# Patient Record
Sex: FEMALE | Race: BLACK OR AFRICAN AMERICAN | Employment: FULL TIME | URBAN - METROPOLITAN AREA
[De-identification: names, ages, dates, MRNs, and addresses within clinical notes are randomized per-mention and may not be internally consistent; named-entity substitution may affect disease eponyms.]

---

## 2017-01-25 DIAGNOSIS — I10 ESSENTIAL HYPERTENSION WITH GOAL BLOOD PRESSURE LESS THAN 140/90: ICD-10-CM

## 2017-01-25 RX ORDER — LISINOPRIL 20 MG/1
20 TABLET ORAL DAILY
Qty: 90 TAB | Refills: 3 | Status: SHIPPED | OUTPATIENT
Start: 2017-01-25 | End: 2018-01-31 | Stop reason: SDUPTHER

## 2017-02-08 ENCOUNTER — CLINICAL SUPPORT (OUTPATIENT)
Dept: CARDIOLOGY CLINIC | Age: 57
End: 2017-02-08

## 2017-02-08 DIAGNOSIS — I46.9 CARDIAC ARREST (HCC): ICD-10-CM

## 2017-02-08 DIAGNOSIS — I51.7 LVH (LEFT VENTRICULAR HYPERTROPHY): Primary | ICD-10-CM

## 2017-02-08 DIAGNOSIS — Z95.810 AICD (AUTOMATIC CARDIOVERTER/DEFIBRILLATOR) PRESENT: ICD-10-CM

## 2017-05-02 ENCOUNTER — TELEPHONE (OUTPATIENT)
Dept: CARDIOLOGY CLINIC | Age: 57
End: 2017-05-02

## 2017-05-10 ENCOUNTER — OFFICE VISIT (OUTPATIENT)
Dept: CARDIOLOGY CLINIC | Age: 57
End: 2017-05-10

## 2017-05-10 ENCOUNTER — CLINICAL SUPPORT (OUTPATIENT)
Dept: CARDIOLOGY CLINIC | Age: 57
End: 2017-05-10

## 2017-05-10 VITALS
HEIGHT: 67 IN | SYSTOLIC BLOOD PRESSURE: 205 MMHG | HEART RATE: 56 BPM | OXYGEN SATURATION: 97 % | DIASTOLIC BLOOD PRESSURE: 105 MMHG | BODY MASS INDEX: 32.96 KG/M2 | WEIGHT: 210 LBS

## 2017-05-10 DIAGNOSIS — B18.1 HEPATITIS B CARRIER (HCC): ICD-10-CM

## 2017-05-10 DIAGNOSIS — I51.7 LVH (LEFT VENTRICULAR HYPERTROPHY): ICD-10-CM

## 2017-05-10 DIAGNOSIS — Z95.810 AICD (AUTOMATIC CARDIOVERTER/DEFIBRILLATOR) PRESENT: ICD-10-CM

## 2017-05-10 DIAGNOSIS — I10 ESSENTIAL HYPERTENSION WITH GOAL BLOOD PRESSURE LESS THAN 140/90: ICD-10-CM

## 2017-05-10 DIAGNOSIS — E78.2 MIXED HYPERLIPIDEMIA: ICD-10-CM

## 2017-05-10 DIAGNOSIS — I46.9 CARDIAC ARREST (HCC): ICD-10-CM

## 2017-05-10 DIAGNOSIS — R73.03 PREDIABETES: ICD-10-CM

## 2017-05-10 DIAGNOSIS — I42.8 CARDIOMYOPATHY, NONISCHEMIC (HCC): Primary | ICD-10-CM

## 2017-05-10 NOTE — PROGRESS NOTES
1. Have you been to the ER, urgent care clinic since your last visit? Hospitalized since your last visit? No    2. Have you seen or consulted any other health care providers outside of the [address] [place] [address] since your last visit? Include any pap smears or colon screening.  No

## 2017-05-10 NOTE — MR AVS SNAPSHOT
Visit Information Date & Time Provider Department Dept. Phone Encounter #  
 5/10/2017 10:30 AM Costa Zheng MD [address] [place] [address] Specialist at [place] [address] [id number] Follow-up Instructions Return in about 1 day (around 5/11/2017). Your Appointments 5/10/2017 10:30 AM  
Follow Up with Costa Zheng MD  
Cardio Specialist at [facility]) Appt Note: dx 6mos check up; Patient confirmed [address] [place] [address]  
  
   
 [address] [place] [address] 5/10/2017  2:20 PM  
CARELINK with Pacer Hv Csi Cardiovascular Specialists [facility] ([facility]) Appt Note: 3 m carelink from 2/8/17 [address] [address] [phone] Guy Feli  
  
    
 8/9/2017  1:20 PM  
CARELINK with [address] [place] [address] Cardiovascular Specialists [facility] ([facility]) Appt Note: 6 m carelink from 2/8/17, last 5/10/17 [address] [address] [phone]  
  
    
 11/8/2017  1:40 PM  
CARELINK with Pacer Hv Csi [facility] ([facility] Appt Note: 9 m carelink, last 8/9/17 [address] [address] [phone] Upcoming Health Maintenance Date Due Hepatitis C Screening 1960 Pneumococcal 19-64 Medium Risk (1 of 1 - PPSV23) 8/20/1981 DTaP/Tdap/Td series (1 - Tdap) 8/20/1983 PAP AKA CERVICAL CYTOLOGY 8/20/1983 BREAST CANCER SCRN MAMMOGRAM 8/20/2012 FOBT Q 1 YEAR AGE 50-75 8/20/2012 INFLUENZA AGE 9 TO ADULT 8/1/2017 Allergies as of 5/10/2017  Review Complete On: 5/10/2017 By: Andres Harrington RN No Known Allergies Current Immunizations  Reviewed on 5/9/2016 No immunizations on file. Not reviewed this visit Vitals BP Pulse Height(growth percentile) Weight(growth percentile) SpO2 BMI  
 (!) 205/105 (!) 56 5' 7\" (1.702 m) 210 lb (95.3 kg) 97% 32.89 kg/m2 OB Status Smoking Status Menopause Former Smoker BMI and BSA Data Body Mass Index Body Surface Area  
 32.89 kg/m 2 2.12 m 2 Preferred Pharmacy Pharmacy Name Phone [facility] [address] [place], [place] [address]. [phone] Your Updated Medication List  
  
   
This list is accurate as of: 5/10/17 10:17 AM.  Always use your most recent med list.  
  
  
  
  
 aspirin delayed-release 81 mg tablet Take 2 Tabs by mouth daily. atorvastatin 10 mg tablet Commonly known as:  LIPITOR  
TAKE ONE TABLET BY MOUTH AT BEDTIME  
  
 carvedilol 25 mg tablet Commonly known as:  Jaida Spinner Take 1 Tab by mouth two (2) times daily (with meals). lisinopril 20 mg tablet Commonly known as:  Marin Lofty Take 1 Tab by mouth daily. magnesium oxide 400 mg tablet Commonly known as:  MAG-OX Take 400 mg by mouth two (2) times a day. Follow-up Instructions Return in about 1 day (around 5/11/2017). Patient Instructions Obtain BP monitor if possible Only check your blood pressure a couple of times per week - at least 2 hours after bp medication (Lisinopril and Coreg) - and if you feel symptomatic Introducing [facility]! [facility] introduces [employer] patient portal. Now you can access parts of your medical record, email your doctor's office, and request medication refills online. 1. In your internet browser, go to [email or web address] 2. Click on the First Time User? Click Here link in the Sign In box. You will see the New Member Sign Up page. 3. Enter your [employer] Access Code exactly as it appears below. You will not need to use this code after youve completed the sign-up process.  If you do not sign up before the expiration date, you must request a new code. · Ellie Access Code: [email or web address] Expires: 8/8/2017 10:17 AM 
 
4. Enter the last four digits of your Social Security Number (xxxx) and Date of Birth (mm/dd/yyyy) as indicated and click Submit. You will be taken to the next sign-up page. 5. Create a Ellie ID. This will be your Ellie login ID and cannot be changed, so think of one that is secure and easy to remember. 6. Create a Ellie password. You can change your password at any time. 7. Enter your Password Reset Question and Answer. This can be used at a later time if you forget your password. 8. Enter your e-mail address. You will receive e-mail notification when new information is available in [address] 9. Click Sign Up. You can now view and download portions of your medical record. 10. Click the Download Summary menu link to download a portable copy of your medical information. If you have questions, please visit the Frequently Asked Questions section of the Ellie website. Remember, Ellie is NOT to be used for urgent needs. For medical emergencies, dial 911. Now available from your iPhone and Android! Please provide this summary of care documentation to your next provider. Your primary care clinician is listed as Toney Baron. If you have any questions after today's visit, please call [phone].

## 2017-05-10 NOTE — PATIENT INSTRUCTIONS
Obtain BP monitor if possible    Only check your blood pressure a couple of times per week - at least 2 hours after bp medication (Lisinopril and Coreg) - and if you feel symptomatic

## 2017-05-15 NOTE — PROGRESS NOTES
Subjective:      Juanita Barcenas is in the office today for cardiac reevaluation. She is a 79-year-old woman who had a cardiac arrest in the setting of hypokalemia on 05/02/2016. During her hospitalization, she was found to have an ejection fraction of only 25%. Prior to her discharge, her ejection fraction improved to 45-50%. She did have a previous cardiac catheterization done in 2014, which showed noncritical coronary artery disease. She had a complicated in-hospital course of acute kidney injury, altered mental status/encephalopathy, and aspiration pneumonia. She ultimately had an AICD placed by Dr. Chela Lewis. The patient returned to work shortly after her hospital discharge. She has been doing well with no complaints. She has had no limiting shortness of breath. She has had no PND or orthopnea. She has been trying to walk some for exercise. Her blood pressure is markedly elevated in the office today. She was instructed to get a blood pressure monitor during her last visit, but she failed to do so. She says that she checks it outside the office at the [facility] and her pressure is always good. Patient Active Problem List    Diagnosis Date Noted    LVH (left ventricular hypertrophy) 07/21/2016    Cardiac arrest ([facility] 05/31/2016    AICD (automatic cardioverter/defibrillator) present 05/31/2016    Essential hypertension with goal blood pressure less than 140/90 05/18/2016    Prediabetes 05/18/2016    Mixed hyperlipidemia 05/18/2016    Hepatitis B carrier ([facility] 05/18/2016    Anemia 05/18/2016    Cardiomyopathy, nonischemic (HCC) 05/04/2016     Current Outpatient Prescriptions   Medication Sig Dispense Refill    lisinopril (PRINIVIL, ZESTRIL) 20 mg tablet Take 1 Tab by mouth daily. 90 Tab 3    carvedilol (COREG) 25 mg tablet Take 1 Tab by mouth two (2) times daily (with meals).  60 Tab 5    atorvastatin (LIPITOR) 10 mg tablet TAKE ONE TABLET BY MOUTH AT BEDTIME 30 Tab 6    magnesium oxide (MAG-OX) 400 mg tablet Take 400 mg by mouth two (2) times a day.  aspirin delayed-release 81 mg tablet Take 2 Tabs by mouth daily. 61 Tab 0     No Known Allergies  Past Medical History:   Diagnosis Date    [facility] 05/2016    s/p ICD    Diabetes [facility]     Hypertension     Nonischemic cardiomyopathy ([facility]      Past Surgical History:   Procedure Laterality Date    HX IMPLANTABLE CARDIOVERTER DEFIBRILLATOR  05/2016     Family History   Problem Relation Age of Onset    Heart Disease Mother 77    Diabetes Father     Stroke Maternal Grandmother      History   Smoking Status    Former Smoker   Smokeless Tobacco    Former User    Quit date: 5/2/2016          Review of Systems, additional:  Constitutional: negative  Eyes: negative  Respiratory: negative  Cardiovascular: negative  Gastrointestinal: negative  Musculoskeletal:negative  Neurological: negative  Behvioral/Psych: negative  Endocrine: negative  ENT: negative    Objective:     Visit Vitals    BP (!) 205/105    Pulse (!) 56    Ht 5' 7\" (1.702 m)    Wt 210 lb (95.3 kg)    SpO2 97%    BMI 32.89 kg/m2     General:  alert, cooperative, no distress   Chest Wall: inspection normal - no chest wall deformities or tenderness, respiratory effort normal   Lung: clear to auscultation bilaterally   Heart:  normal rate and regular rhythm, S1 and S2 normal, no murmurs noted, no gallops noted   Abdomen: soft, non-tender. Bowel sounds normal. No masses,  no organomegaly   Extremities: extremities normal, atraumatic, no cyanosis or edema Skin: no rashes   Neuro: alert, oriented, normal speech, no focal findings or movement disorder noted         Assessment/Plan:       ICD-10-CM ICD-9-CM    1. Cardiomyopathy, nonischemic ([facility] last EF 45-50%. I42.8 425.4    2. Cardiac arrest (HCC) I46.9 427.5    3.  Essential hypertension with goal blood pressure less than 140/90, severely elevated in office today. She believes it is only elevated when she sees us in the office. She has not purchased a BP cuff as of yet. RT 1 month for BP check. RT 4 mos. I10 401.9    4. LVH (left ventricular hypertrophy) I51.7 429.3    5. Hepatitis B carrier (HCC) B18.1 V02.61    6. Prediabetes R73.03 790.29    7. AICD (automatic cardioverter/defibrillator) present Z95.810 V45.02    8.  Mixed hyperlipidemia E78.2 272.2

## 2017-06-07 ENCOUNTER — CLINICAL SUPPORT (OUTPATIENT)
Dept: CARDIOLOGY CLINIC | Age: 57
End: 2017-06-07

## 2017-06-07 VITALS — HEART RATE: 58 BPM | OXYGEN SATURATION: 98 % | DIASTOLIC BLOOD PRESSURE: 115 MMHG | SYSTOLIC BLOOD PRESSURE: 221 MMHG

## 2017-06-07 DIAGNOSIS — I10 ESSENTIAL HYPERTENSION WITH GOAL BLOOD PRESSURE LESS THAN 140/90: Primary | ICD-10-CM

## 2017-06-07 RX ORDER — AMLODIPINE BESYLATE 10 MG/1
10 TABLET ORAL DAILY
Qty: 30 TAB | Refills: 6 | Status: SHIPPED | OUTPATIENT
Start: 2017-06-07 | End: 2018-01-03 | Stop reason: SDUPTHER

## 2017-06-07 NOTE — MR AVS SNAPSHOT
Visit Information Date & Time Provider Department Dept. Phone Encounter #  
 6/7/2017 10:00 AM Bp/Ekg Nii Doherty Cardio Specialist at [place] [address] [id number] Your Appointments 6/21/2017  1:30 PM  
Nurse Visit with Bp/Ekg Nolvia Csi Cardio Specialist at [facility]) Appt Note: 2 week BP check [address] [place] [address]  
  
    
 8/9/2017  1:20 PM  
CARELINK with Bennett Serranoi Cardiovascular Specialists [facility]) Appt Note: 6 m carelink from 2/8/17, last 5/10/17 [place] [address]  
[phone] [address] 9/13/2017 10:15 AM  
Follow Up with Trisha Arce MD  
Cardio Specialist at [facility]) Appt Note: 4 month f/u -kmc [address] [place]  
  
    
 11/8/2017 10:15 AM  
PROCEDURE with Bennett De Souza Csi Cardio Specialist at [facility]) Appt Note: 6 month f/u -kmc [address] [place] Upcoming Health Maintenance Date Due Hepatitis C Screening 1960 Pneumococcal 19-64 Medium Risk (1 of 1 - PPSV23) 8/20/1981 DTaP/Tdap/Td series (1 - Tdap) 8/20/1983 PAP AKA CERVICAL CYTOLOGY 8/20/1983 BREAST CANCER SCRN MAMMOGRAM 8/20/2012 FOBT Q 1 YEAR AGE 50-75 8/20/2012 INFLUENZA AGE 9 TO ADULT 8/1/2017 Allergies as of 6/7/2017  Review Complete On: 5/14/2017 By: Trisha Arce MD  
 No Known Allergies Current Immunizations  Reviewed on 5/9/2016 No immunizations on file. Not reviewed this visit You Were Diagnosed With   
  
 Codes Comments Essential hypertension with goal blood pressure less than 140/90    -  Primary ICD-10-CM: I10 
ICD-9-CM: 401.9 Vitals BP Pulse SpO2 OB Status Smoking Status (!) 221/115 (!) 58 98% Menopause Former Smoker Vitals History Preferred Pharmacy Pharmacy Name Phone [facility] [phone] Your Updated Medication List  
  
   
This list is accurate as of: 6/7/17 10:46 AM.  Always use your most recent med list.  
  
  
  
  
 aspirin delayed-release 81 mg tablet Take 2 Tabs by mouth daily. atorvastatin 10 mg tablet Commonly known as:  LIPITOR  
TAKE ONE TABLET BY MOUTH AT BEDTIME  
  
 carvedilol 25 mg tablet Commonly known as:  Merry Schimke Take 1 Tab by mouth two (2) times daily (with meals). lisinopril 20 mg tablet Commonly known as:  London Camel Take 1 Tab by mouth daily. magnesium oxide 400 mg tablet Commonly known as:  MAG-OX Take 400 mg by mouth two (2) times a day. Patient Instructions Start Norvasc 10mg Daily Repeat 2 week BP check Introducing [facility]! Aleja Evans introduces [email or web address] patient portal. Now you can access parts of your medical record, email your doctor's office, and request medication refills online. 1. In your internet browser, go to [email or web address] 2. Click on the First Time User? Click Here link in the Sign In box. You will see the New Member Sign Up page. 3. Enter your [email or web address] Access Code exactly as it appears below. You will not need to use this code after youve completed the sign-up process. If you do not sign up before the expiration date, you must request a new code. · [email or web address] Access Code: [email or web address] Expires: 8/8/2017 10:17 AM 
 
4.  Enter the last four digits of your Social Security Number (xxxx) and Date of Birth (mm/dd/yyyy) as indicated and click Submit. You will be taken to the next sign-up page. 5. Create a [employer] ID. This will be your [employer] login ID and cannot be changed, so think of one that is secure and easy to remember. 6. Create a [employer] password. You can change your password at any time. 7. Enter your Password Reset Question and Answer. This can be used at a later time if you forget your password. 8. Enter your e-mail address. You will receive e-mail notification when new information is available in [address] 9. Click Sign Up. You can now view and download portions of your medical record. 10. Click the Download Summary menu link to download a portable copy of your medical information. If you have questions, please visit the Frequently Asked Questions section of the [employer] website. Remember, [employer] is NOT to be used for urgent needs. For medical emergencies, dial 911. Now available from your iPhone and Android! Please provide this summary of care documentation to your next provider. Your primary care clinician is listed as Cydney Lesch. If you have any questions after today's visit, please call [phone].

## 2017-06-07 NOTE — PROGRESS NOTES
Shireen Robledo is a 49 y.o. female that is here for a blood pressure check. Her current medications are listed below. Current Outpatient Prescriptions   Medication Sig    lisinopril (PRINIVIL, ZESTRIL) 20 mg tablet Take 1 Tab by mouth daily.  carvedilol (COREG) 25 mg tablet Take 1 Tab by mouth two (2) times daily (with meals).  atorvastatin (LIPITOR) 10 mg tablet TAKE ONE TABLET BY MOUTH AT BEDTIME    magnesium oxide (MAG-OX) 400 mg tablet Take 400 mg by mouth two (2) times a day.  aspirin delayed-release 81 mg tablet Take 2 Tabs by mouth daily. No current facility-administered medications for this visit. Her   Visit Vitals    BP (!) 221/115    Pulse (!) 58    SpO2 98%             She took her medications at about 7:00am. No complaints at this time.      Verbal order and read back per Dr. Cheri Durand  Start Norvasc 10mg Daily   Repeat 2 week BP check

## 2017-06-21 ENCOUNTER — CLINICAL SUPPORT (OUTPATIENT)
Dept: CARDIOLOGY CLINIC | Age: 57
End: 2017-06-21

## 2017-06-21 VITALS — DIASTOLIC BLOOD PRESSURE: 84 MMHG | HEART RATE: 58 BPM | OXYGEN SATURATION: 98 % | SYSTOLIC BLOOD PRESSURE: 152 MMHG

## 2017-06-21 DIAGNOSIS — I10 ESSENTIAL HYPERTENSION WITH GOAL BLOOD PRESSURE LESS THAN 140/90: Primary | ICD-10-CM

## 2017-06-21 NOTE — PROGRESS NOTES
Rashmi Guerin is a 49 y.o. female that is here for a blood pressure check. Her current medications are listed below. Current Outpatient Prescriptions   Medication Sig    amLODIPine (NORVASC) 10 mg tablet Take 1 Tab by mouth daily.  lisinopril (PRINIVIL, ZESTRIL) 20 mg tablet Take 1 Tab by mouth daily.  carvedilol (COREG) 25 mg tablet Take 1 Tab by mouth two (2) times daily (with meals).  atorvastatin (LIPITOR) 10 mg tablet TAKE ONE TABLET BY MOUTH AT BEDTIME    magnesium oxide (MAG-OX) 400 mg tablet Take 400 mg by mouth two (2) times a day.  aspirin delayed-release 81 mg tablet Take 2 Tabs by mouth daily. No current facility-administered medications for this visit. Her   Visit Vitals    /84    Pulse (!) 58    SpO2 98%     Patient was here today for a blood pressure check after starting amlodipine 10mg daily. Patient has no complaints or concerns at this time. Vitals are as above. Plan:     Patient to continue current medications. Advised patient that I would forward to Dr. Joe Ritter for review and further instructions. Advised patient that we would call her with any changes. Patient verbalized understanding.

## 2017-09-13 ENCOUNTER — OFFICE VISIT (OUTPATIENT)
Dept: CARDIOLOGY CLINIC | Age: 57
End: 2017-09-13

## 2017-09-13 VITALS
WEIGHT: 209 LBS | HEIGHT: 67 IN | OXYGEN SATURATION: 97 % | HEART RATE: 61 BPM | SYSTOLIC BLOOD PRESSURE: 141 MMHG | BODY MASS INDEX: 32.8 KG/M2 | DIASTOLIC BLOOD PRESSURE: 81 MMHG

## 2017-09-13 DIAGNOSIS — I42.8 CARDIOMYOPATHY, NONISCHEMIC (HCC): ICD-10-CM

## 2017-09-13 DIAGNOSIS — Z95.810 AICD (AUTOMATIC CARDIOVERTER/DEFIBRILLATOR) PRESENT: ICD-10-CM

## 2017-09-13 DIAGNOSIS — I10 ESSENTIAL HYPERTENSION WITH GOAL BLOOD PRESSURE LESS THAN 140/90: ICD-10-CM

## 2017-09-13 DIAGNOSIS — E78.2 MIXED HYPERLIPIDEMIA: Primary | ICD-10-CM

## 2017-09-13 DIAGNOSIS — R73.03 PREDIABETES: ICD-10-CM

## 2017-09-13 DIAGNOSIS — I51.7 LVH (LEFT VENTRICULAR HYPERTROPHY): ICD-10-CM

## 2017-09-13 DIAGNOSIS — I46.9 CARDIAC ARREST (HCC): ICD-10-CM

## 2017-09-13 NOTE — PROGRESS NOTES
1. Have you been to the ER, urgent care clinic since your last visit? Hospitalized since your last visit? No    2. Have you seen or consulted any other health care providers outside of the [address] [place] [address] since your last visit? Include any pap smears or colon screening.  No

## 2017-09-13 NOTE — MR AVS SNAPSHOT
Visit Information Date & Time Provider Department Dept. Phone Encounter #  
 9/13/2017 10:15 AM Camryn Ardon  [facility] Specialist at [facility] [phone] Follow-up Instructions Return in about 6 months (around 3/13/2018). Your Appointments 11/8/2017 10:15 AM  
PROCEDURE with Bennett De Souza Csi Cardio Specialist at [facility] Appt Note: 6 month f/u -kmc [address] [place]  
  
   
 [address] [facility] Upcoming Health Maintenance Date Due Hepatitis C Screening 1960 Pneumococcal 19-64 Medium Risk (1 of 1 - PPSV23) 8/20/1981 DTaP/Tdap/Td series (1 - Tdap) 8/20/1983 PAP AKA CERVICAL CYTOLOGY 8/20/1983 BREAST CANCER SCRN MAMMOGRAM 8/20/2012 FOBT Q 1 YEAR AGE 50-75 8/20/2012 INFLUENZA AGE 9 TO ADULT 8/1/2017 Allergies as of 9/13/2017  Review Complete On: 9/13/2017 By: Lydia Ramirez RN No Known Allergies Current Immunizations  Reviewed on 5/9/2016 No immunizations on file. Not reviewed this visit You Were Diagnosed With   
  
 Codes Comments Mixed hyperlipidemia    -  Primary ICD-10-CM: A20.1 ICD-9-CM: 272.2 Vitals BP Pulse Height(growth percentile) Weight(growth percentile) SpO2 BMI  
 141/81 61 5' 7\" (1.702 m) 209 lb (94.8 kg) 97% 32.73 kg/m2 OB Status Smoking Status Menopause Former Smoker BMI and BSA Data Body Mass Index Body Surface Area 32.73 kg/m 2 2.12 m 2 Preferred Pharmacy Pharmacy Name Phone [facility] [address] [place] [address] [phone] Your Updated Medication List  
  
   
This list is accurate as of: 9/13/17 10:28 AM.  Always use your most recent med list. amLODIPine 10 mg tablet Commonly known as:  Contreras Goyal Take 1 Tab by mouth daily. aspirin delayed-release 81 mg tablet Take 2 Tabs by mouth daily. atorvastatin 10 mg tablet Commonly known as:  LIPITOR  
TAKE ONE TABLET BY MOUTH AT BEDTIME  
  
 carvedilol 25 mg tablet Commonly known as:  Raynette Hy Take 1 Tab by mouth two (2) times daily (with meals). lisinopril 20 mg tablet Commonly known as:  Fatoumata Nelida Take 1 Tab by mouth daily. Follow-up Instructions Return in about 6 months (around 3/13/2018). To-Do List   
 09/13/2017 Lab:  LIPID PANEL Introducing [facility]! Bessie Ocasio introduces [employer] patient portal. Now you can access parts of your medical record, email your doctor's office, and request medication refills online. 1. In your internet browser, go to [email or web address] 2. Click on the First Time User? Click Here link in the Sign In box. You will see the New Member Sign Up page. 3. Enter your [employer] Access Code exactly as it appears below. You will not need to use this code after youve completed the sign-up process. If you do not sign up before the expiration date, you must request a new code. · [employer] Access Code: [email or web address] Expires: 12/12/2017 10:28 AM 
 
4. Enter the last four digits of your Social Security Number (xxxx) and Date of Birth (mm/dd/yyyy) as indicated and click Submit. You will be taken to the next sign-up page. 5. Create a B[employer] ID. This will be your [employer] login ID and cannot be changed, so think of one that is secure and easy to remember. 6. Create a [employer] password. You can change your password at any time. 7. Enter your Password Reset Question and Answer. This can be used at a later time if you forget your password. 8. Enter your e-mail address. You will receive e-mail notification when new information is available in [address] 9. Click Sign Up. You can now view and download portions of your medical record. 10. Click the Download Summary menu link to download a portable copy of your medical information. If you have questions, please visit the Frequently Asked Questions section of the [employer] website. Remember, [employer] is NOT to be used for urgent needs. For medical emergencies, dial 911. Now available from your iPhone and Android! Please provide this summary of care documentation to your next provider. Your primary care clinician is listed as Jazzy Davis. If you have any questions after today's visit, please call [phone].

## 2017-09-17 NOTE — PROGRESS NOTES
Subjective:      Kaley Packer is in the office today for cardiac reevaluation. She is a 44-year-old woman who had a cardiac arrest in the setting of hypokalemia on 05/02/2016. During her hospitalization, she was found to have an ejection fraction of only 25%. Prior to her discharge, her ejection fraction improved to 45-50%. She did have a previous cardiac catheterization done in 2014, which showed noncritical coronary artery disease. She had a complicated in-hospital course with acute kidney injury, altered mental status/encephalopathy, and aspiration pneumonia. She ultimately had an AICD placed by Dr. Zenia Post. The patient returned to work shortly after her hospital discharge. She has continued to do well without complaints. She has had no limiting shortness of breath. She has had no PND or orthopnea. Her blood pressure was markedly elevated on her last visit of 5/10/2017. She says that she checks it outside the office at the [employer] and her pressure is always good. It is better in the office today. Patient Active Problem List    Diagnosis Date Noted    LVH (left ventricular hypertrophy) 07/21/2016    Cardiac arrest ([facility] 05/31/2016    AICD (automatic cardioverter/defibrillator) present 05/31/2016    Essential hypertension with goal blood pressure less than 140/90 05/18/2016    Prediabetes 05/18/2016    Mixed hyperlipidemia 05/18/2016    Hepatitis B carrier ([facility] 05/18/2016    Anemia 05/18/2016    Cardiomyopathy, nonischemic (HCC) 05/04/2016     Current Outpatient Prescriptions   Medication Sig Dispense Refill    amLODIPine (NORVASC) 10 mg tablet Take 1 Tab by mouth daily. 30 Tab 6    lisinopril (PRINIVIL, ZESTRIL) 20 mg tablet Take 1 Tab by mouth daily. 90 Tab 3    carvedilol (COREG) 25 mg tablet Take 1 Tab by mouth two (2) times daily (with meals).  60 Tab 5    atorvastatin (LIPITOR) 10 mg tablet TAKE ONE TABLET BY MOUTH AT BEDTIME 30 Tab 6    aspirin delayed-release 81 mg tablet Take 2 Tabs by mouth daily. 61 Tab 0     No Known Allergies  Past Medical History:   Diagnosis Date    [facility] 05[facility]2016    s/p ICD    Diabetes [facility]     Hypertension     Nonischemic cardiomyopathy ([facility]      Past Surgical History:   Procedure Laterality Date    HX IMPLANTABLE CARDIOVERTER DEFIBRILLATOR  05/2016     Family History   Problem Relation Age of Onset    Heart Disease Mother 77    Diabetes Father     Stroke Maternal Grandmother      History   Smoking Status    Former Smoker   Smokeless Tobacco    Former User    Quit date: 5/2/2016          Review of Systems, additional:  Constitutional: negative  Eyes: negative  Respiratory: negative  Cardiovascular: negative  Gastrointestinal: negative  Musculoskeletal:negative  Neurological: negative  Behvioral/Psych: negative  Endocrine: negative  ENT: negative    Objective:     Visit Vitals    /81    Pulse 61    Ht 5' 7\" (1.702 m)    Wt 209 lb (94.8 kg)    SpO2 97%    BMI 32.73 kg/m2     General:  alert, cooperative, no distress   Chest Wall: inspection normal - no chest wall deformities or tenderness, respiratory effort normal   Lung: clear to auscultation bilaterally   Heart:  normal rate and regular rhythm, S1 and S2 normal, no murmurs noted, no gallops noted   Abdomen: soft, non-tender. Bowel sounds normal. No masses,  no organomegaly   Extremities: extremities normal, atraumatic, no cyanosis or edema Skin: no rashes   Neuro: alert, oriented, normal speech, no focal findings or movement disorder noted         Assessment/Plan:       ICD-10-CM ICD-9-CM    1. Cardiomyopathy, nonischemic ([facility] last EF 45-50%. I42.8 425.4    2. Cardiac arrest ([facility] post, 05/2016 I46.9 427.5    3. Essential hypertension with goal blood pressure less than 140/90,  Readings better in the office today. RT 6 mos. I10 401.9    4. LVH (left ventricular hypertrophy) I51.7 429.3    5.  Hepatitis B carrier ([facility] B18.1 V02.61    6. Prediabetes R73.03 790.29    7. AICD (automatic cardioverter/defibrillator) present Z95.810 V45.02    8.  Mixed hyperlipidemia E78.2 272.2

## 2017-11-08 ENCOUNTER — TELEPHONE (OUTPATIENT)
Dept: CARDIOLOGY CLINIC | Age: 57
End: 2017-11-08

## 2017-12-13 ENCOUNTER — CLINICAL SUPPORT (OUTPATIENT)
Dept: CARDIOLOGY CLINIC | Age: 57
End: 2017-12-13

## 2017-12-13 DIAGNOSIS — I51.7 LVH (LEFT VENTRICULAR HYPERTROPHY): ICD-10-CM

## 2017-12-13 DIAGNOSIS — Z95.810 AICD (AUTOMATIC CARDIOVERTER/DEFIBRILLATOR) PRESENT: ICD-10-CM

## 2017-12-13 DIAGNOSIS — I42.8 CARDIOMYOPATHY, NONISCHEMIC (HCC): Primary | ICD-10-CM

## 2018-01-03 RX ORDER — AMLODIPINE BESYLATE 10 MG/1
TABLET ORAL
Qty: 30 TAB | Refills: 6 | Status: SHIPPED | OUTPATIENT
Start: 2018-01-03 | End: 2018-03-14 | Stop reason: SDUPTHER

## 2018-01-31 DIAGNOSIS — I10 ESSENTIAL HYPERTENSION WITH GOAL BLOOD PRESSURE LESS THAN 140/90: ICD-10-CM

## 2018-02-01 RX ORDER — LISINOPRIL 20 MG/1
TABLET ORAL
Qty: 30 TAB | Refills: 11 | Status: SHIPPED | OUTPATIENT
Start: 2018-02-01 | End: 2018-03-14 | Stop reason: SDUPTHER

## 2018-03-09 ENCOUNTER — TELEPHONE (OUTPATIENT)
Dept: CARDIOLOGY CLINIC | Age: 58
End: 2018-03-09

## 2018-03-09 NOTE — TELEPHONE ENCOUNTER
Called and left message for patient to call office. Reminding patient to have lipid panel done prior to next weeks appointment.

## 2018-03-14 ENCOUNTER — OFFICE VISIT (OUTPATIENT)
Dept: CARDIOLOGY CLINIC | Age: 58
End: 2018-03-14

## 2018-03-14 VITALS
DIASTOLIC BLOOD PRESSURE: 81 MMHG | WEIGHT: 211 LBS | HEIGHT: 67 IN | HEART RATE: 58 BPM | OXYGEN SATURATION: 98 % | BODY MASS INDEX: 33.12 KG/M2 | SYSTOLIC BLOOD PRESSURE: 149 MMHG

## 2018-03-14 DIAGNOSIS — I42.8 CARDIOMYOPATHY, NONISCHEMIC (HCC): ICD-10-CM

## 2018-03-14 DIAGNOSIS — I51.7 LVH (LEFT VENTRICULAR HYPERTROPHY): Primary | ICD-10-CM

## 2018-03-14 DIAGNOSIS — I46.9 CARDIAC ARREST (HCC): ICD-10-CM

## 2018-03-14 DIAGNOSIS — I10 ESSENTIAL HYPERTENSION WITH GOAL BLOOD PRESSURE LESS THAN 140/90: ICD-10-CM

## 2018-03-14 DIAGNOSIS — Z95.810 AICD (AUTOMATIC CARDIOVERTER/DEFIBRILLATOR) PRESENT: ICD-10-CM

## 2018-03-14 RX ORDER — ATORVASTATIN CALCIUM 10 MG/1
TABLET, FILM COATED ORAL
Qty: 90 TAB | Refills: 3 | Status: SHIPPED | OUTPATIENT
Start: 2018-03-14 | End: 2019-06-14 | Stop reason: ALTCHOICE

## 2018-03-14 RX ORDER — ASPIRIN 81 MG/1
162 TABLET ORAL DAILY
Qty: 180 TAB | Refills: 3 | Status: SHIPPED | OUTPATIENT
Start: 2018-03-14

## 2018-03-14 RX ORDER — AMLODIPINE BESYLATE 10 MG/1
TABLET ORAL
Qty: 90 TAB | Refills: 3 | Status: SHIPPED | OUTPATIENT
Start: 2018-03-14 | End: 2019-05-30 | Stop reason: SDUPTHER

## 2018-03-14 RX ORDER — CARVEDILOL 25 MG/1
TABLET ORAL
Qty: 180 TAB | Refills: 3 | Status: SHIPPED | OUTPATIENT
Start: 2018-03-14 | End: 2019-03-27 | Stop reason: SDUPTHER

## 2018-03-14 RX ORDER — ASCORBIC ACID 500 MG
TABLET ORAL
COMMUNITY

## 2018-03-14 RX ORDER — LISINOPRIL 20 MG/1
TABLET ORAL
Qty: 90 TAB | Refills: 3 | Status: SHIPPED | OUTPATIENT
Start: 2018-03-14 | End: 2019-04-16 | Stop reason: SDUPTHER

## 2018-03-14 NOTE — PROGRESS NOTES
1. Have you been to the ER, urgent care clinic since your last visit? Hospitalized since your last visit? No    2. Have you seen or consulted any other health care providers outside of the [address] [place] [address] since your last visit? Include any pap smears or colon screening.  No

## 2018-03-14 NOTE — MR AVS SNAPSHOT
[address] [facility] [address] [address] 
[phone] Patient: Flavia Lawson MRN: [id number] :1960 Visit Information Date & Time Provider Department Dept. Phone Encounter #  
 3/14/2018 10:00 AM Tejinder Herzog MD [address] [place] [address] Specialist at [facility]/\[facility]\"" DRIVE [phone] [id number] Follow-up Instructions Return in about 6 months (around 2018). Your Appointments 3/14/2018  1:20 PM  
[place] with Pacer Hv Csi [facility] [place] ([facility] Appt Note: 3 month check after in office in  -[place] [id number] [id number] [phone] [address] 2018  1:00 PM  
[place] with [address] [place] [address] Cardiovascular Specialists [place] [facility] ([facility] Appt Note: 3 month after March check -[facility] [address] [address] [address] [phone] 2018  3:20 PM  
[place] with Pacer Hv [facility] [place] ([facility] Appt Note: 3 month check after  -[facility] [address] [address] [address] [phone] 2018 10:45 AM  
Follow Up with Tejinder Herzog MD  
Cardio Specialist at [facility] Appt Note: 6 months [facility] [address] [place]  
  
    
 2018  9:30 AM  
PROCEDURE with Pacelesley De Souza Csi Cardio Specialist at [facility] Appt Note: 1 year in office device check -[facility] [address] [place] Upcoming Health Maintenance Date Due Hepatitis C Screening 1960 Pneumococcal 19-64 Medium Risk (1 of 1 - PPSV23) 8/20/1981 DTaP/Tdap/Td series (1 - Tdap) 8/20/1983 PAP AKA CERVICAL CYTOLOGY 8/20/1983 BREAST CANCER SCRN MAMMOGRAM 8/20/2012 FOBT Q 1 YEAR AGE 50-75 8/20/2012 Influenza Age 5 to Adult 8/1/2017 Allergies as of 3/14/2018  Review Complete On: 3/14/2018 By: Tamra Ramirez RN No Known Allergies Current Immunizations  Reviewed on 5/9/2016 No immunizations on file. Not reviewed this visit You Were Diagnosed With   
  
 Codes Comments Essential hypertension with goal blood pressure less than 140/90     ICD-10-CM: I10 
ICD-9-CM: 401.9 Vitals BP Pulse Height(growth percentile) Weight(growth percentile) SpO2 BMI  
 149/81 (!) 58 5' 7\" (1.702 m) 211 lb (95.7 kg) 98% 33.05 kg/m2 OB Status Smoking Status Menopause Former Smoker BMI and BSA Data Body Mass Index Body Surface Area 33.05 kg/m 2 2.13 m 2 Preferred Pharmacy Pharmacy Name Phone [facility] [address] [place] [address] 263. [phone] Your Updated Medication List  
  
   
This list is accurate as of 3/14/18 10:50 AM.  Always use your most recent med list. amLODIPine 10 mg tablet Commonly known as:  Larayne Bulmaro TAKE ONE TABLET BY MOUTH ONCE DAILY  
  
 aspirin delayed-release 81 mg tablet Take 2 Tabs by mouth daily. atorvastatin 10 mg tablet Commonly known as:  LIPITOR  
TAKE ONE TABLET BY MOUTH AT BEDTIME  
  
 carvedilol 25 mg tablet Commonly known as:  COREG  
TAKE ONE TABLET BY MOUTH TWICE DAILY WITH MEALS  
  
 lisinopril 20 mg tablet Commonly known as:  PRINIVIL, ZESTRIL  
TAKE ONE TABLET BY MOUTH ONCE DAILY  
  
 VITAMIN C 500 mg tablet Generic drug:  ascorbic acid (vitamin C) Take  by mouth. Follow-up Instructions Return in about 6 months (around 9/14/2018). Introducing [address]! Angeles Gupta introduces Capt[employer] patient portal. Now you can access parts of your medical record, email your doctor's office, and request medication refills online. 1. In your internet browser, go to [email or web address]. 2. Click on the First Time User? Click Here link in the Sign In box. You will see the New Member Sign Up page. 3. Enter your Captify Access Code exactly as it appears below. You will not need to use this code after youve completed the sign-up process. If you do not sign up before the expiration date, you must request a new code. · Capt[employer] Access Code: [email or web address] Expires: 6/12/2018 10:50 AM 
 
4. Enter the last four digits of your Social Security Number (xxxx) and Date of Birth (mm/dd/yyyy) as indicated and click Submit. You will be taken to the next sign-up page. 5. Create a Captify ID. This will be your Captify login ID and cannot be changed, so think of one that is secure and easy to remember. 6. Create a Captify password. You can change your password at any time. 7. Enter your Password Reset Question and Answer. This can be used at a later time if you forget your password. 8. Enter your e-mail address. You will receive e-mail notification when new information is available in [address] 9. Click Sign Up. You can now view and download portions of your medical record. 10. Click the Download Summary menu link to download a portable copy of your medical information. If you have questions, please visit the Frequently Asked Questions section of the Capt[employer] website. Remember, Capt[employer] is NOT to be used for urgent needs. For medical emergencies, dial 911. Now available from your iPhone and Android! Please provide this summary of care documentation to your next provider. Your primary care clinician is listed as Mickey Phillips. If you have any questions after today's visit, please call [phone].

## 2018-03-19 NOTE — PROGRESS NOTES
Subjective:      China Batista is in the office today for cardiac reevaluation. She is a 61-year-old woman who had a cardiac arrest in the setting of hypokalemia on 05/02/2016. During her hospitalization, she was found to have an ejection fraction of only 25%. Prior to her discharge, her ejection fraction improved to 45-50%. She did have a previous cardiac catheterization done in 2014, which showed noncritical coronary artery disease. She had a complicated in-hospital course with acute kidney injury, altered mental status/encephalopathy, and aspiration pneumonia. She ultimately had an AICD placed by Dr. Ofe Becerra. The patient returned to work shortly after her hospital discharge. She has continued to do well without complaints. Her breathing has been good. She has had no PND or orthopnea. Her blood pressure was markedly elevated on her last visit of 5/10/2017. She says that she checks it outside the office at the [facility] and her pressure is always good. It is better in the office today. She has been experiencing a lot of stress at work. Patient Active Problem List    Diagnosis Date Noted    LVH (left ventricular hypertrophy) 07/21/2016    Cardiac arrest ([facility] [place] 05/31/2016    AICD (automatic cardioverter/defibrillator) present 05/31/2016    Essential hypertension with goal blood pressure less than 140/90 05/18/2016    Prediabetes 05/18/2016    Mixed hyperlipidemia 05/18/2016    Hepatitis B carrier ([facility] [place] 05/18/2016    Anemia 05/18/2016    Cardiomyopathy, nonischemic ([facility] 05/04/2016     Current Outpatient Prescriptions   Medication Sig Dispense Refill    ascorbic acid, vitamin C, (VITAMIN C) 500 mg tablet Take  by mouth.       lisinopril (PRINIVIL, ZESTRIL) 20 mg tablet TAKE ONE TABLET BY MOUTH ONCE DAILY 90 Tab 3    carvedilol (COREG) 25 mg tablet TAKE ONE TABLET BY MOUTH TWICE DAILY WITH MEALS 180 Tab 3    amLODIPine (NORVASC) 10 mg tablet TAKE ONE TABLET BY MOUTH ONCE DAILY 90 Tab 3    aspirin delayed-release 81 mg tablet Take 2 Tabs by mouth daily. 180 Tab 3    atorvastatin (LIPITOR) 10 mg tablet TAKE ONE TABLET BY MOUTH AT BEDTIME 90 Tab 3     No Known Allergies  Past Medical History:   Diagnosis Date    [facility]2016    s/p ICD    [facility]     Hypertension     Nonischemic cardiomyopathy ([facility]      Past Surgical History:   Procedure Laterality Date    HX IMPLANTABLE CARDIOVERTER DEFIBRILLATOR  05/2016     Family History   Problem Relation Age of Onset    Heart Disease Mother 77    Diabetes Father     Stroke Maternal Grandmother      History   Smoking Status    Former Smoker   Smokeless Tobacco    Former User    Quit date: 5/2/2016          Review of Systems, additional:  Constitutional: negative  Eyes: negative  Respiratory: negative  Cardiovascular: negative  Gastrointestinal: negative  Musculoskeletal:negative  Neurological: negative  Behvioral/Psych: negative  Endocrine: negative  ENT: negative    Objective:     Visit Vitals    /81    Pulse (!) 58    Ht 5' 7\" (1.702 m)    Wt 211 lb (95.7 kg)    SpO2 98%    BMI 33.05 kg/m2     General:  alert, cooperative, no distress   Chest Wall: inspection normal - no chest wall deformities or tenderness, respiratory effort normal   Lung: clear to auscultation bilaterally   Heart:  normal rate and regular rhythm, S1 and S2 normal, no murmurs noted, no gallops noted   Abdomen: soft, non-tender. Bowel sounds normal. No masses,  no organomegaly   Extremities: extremities normal, atraumatic, no cyanosis or edema Skin: no rashes   Neuro: alert, oriented, normal speech, no focal findings or movement disorder noted         Assessment/Plan:       ICD-10-CM ICD-9-CM    1. Cardiomyopathy, nonischemic ([facility] last EF 45-50%. I42.8 425.4    2. Cardiac arrest ([facility] post, 05/2016 I46.9 427.5    3.  Essential hypertension with goal blood pressure less than 140/90, Stable in the office today. RT 6 mos. I10 401.9    4. LVH (left ventricular hypertrophy) I51.7 429.3    5. Hepatitis B carrier (HCC) B18.1 V02.61    6. Prediabetes R73.03 790.29    7. AICD (automatic cardioverter/defibrillator) present Z95.810 V45.02    8.  Mixed hyperlipidemia E78.2 272.2

## 2018-03-23 ENCOUNTER — TELEPHONE (OUTPATIENT)
Dept: CARDIOLOGY CLINIC | Age: 58
End: 2018-03-23

## 2018-03-23 NOTE — LETTER
3/23/2018 1:03 PM 
 
[facility] [address] [phone] Wayne Nichols was seen in our office on March 14, 2018 for cardiac evaluation. From a cardiac standpoint she dental extractions done. Please feel free to contact our office if you have any questions regarding this patient. Sincerely, Hunter Griffin MD

## 2018-03-23 NOTE — TELEPHONE ENCOUNTER
Patient Driss Steen would like to get a letter stating it is ok for her to have a tooth extraction. Patient is scheduled to have tooth removed on 3/28/2018, she was informed by her dentist she would need a letter from her cardiologist. Patient would like to  letter on Monday 3/26/2018 she can be reached with any questions at .

## 2018-03-23 NOTE — TELEPHONE ENCOUNTER
Reviewed with Dr. Damien Chávez order and read back per Rigoberto Garcia MD  She can proceed with dental work.  LTR done     Patient aware and she will  the letter on Monday

## 2018-04-25 ENCOUNTER — CLINICAL SUPPORT (OUTPATIENT)
Dept: CARDIOLOGY CLINIC | Age: 58
End: 2018-04-25

## 2018-04-25 DIAGNOSIS — I46.9 CARDIAC ARREST (HCC): ICD-10-CM

## 2018-04-25 DIAGNOSIS — I51.7 LVH (LEFT VENTRICULAR HYPERTROPHY): Primary | ICD-10-CM

## 2018-04-25 DIAGNOSIS — Z95.810 AICD (AUTOMATIC CARDIOVERTER/DEFIBRILLATOR) PRESENT: ICD-10-CM

## 2018-06-13 ENCOUNTER — OFFICE VISIT (OUTPATIENT)
Dept: CARDIOLOGY CLINIC | Age: 58
End: 2018-06-13

## 2018-06-13 DIAGNOSIS — I46.9 CARDIAC ARREST (HCC): Primary | ICD-10-CM

## 2018-06-13 DIAGNOSIS — I42.8 CARDIOMYOPATHY, NONISCHEMIC (HCC): ICD-10-CM

## 2018-06-13 DIAGNOSIS — Z95.810 AICD (AUTOMATIC CARDIOVERTER/DEFIBRILLATOR) PRESENT: ICD-10-CM

## 2018-07-05 NOTE — PROGRESS NOTES
I have personally seen and evaluated the device findings. Interrogation reviewed and I agree with assessment.     Gama Escobar

## 2018-10-10 ENCOUNTER — OFFICE VISIT (OUTPATIENT)
Dept: CARDIOLOGY CLINIC | Age: 58
End: 2018-10-10

## 2018-10-10 ENCOUNTER — CLINICAL SUPPORT (OUTPATIENT)
Dept: CARDIOLOGY CLINIC | Age: 58
End: 2018-10-10

## 2018-10-10 VITALS
DIASTOLIC BLOOD PRESSURE: 94 MMHG | BODY MASS INDEX: 32.65 KG/M2 | SYSTOLIC BLOOD PRESSURE: 142 MMHG | HEIGHT: 67 IN | WEIGHT: 208 LBS | OXYGEN SATURATION: 98 % | HEART RATE: 62 BPM

## 2018-10-10 DIAGNOSIS — I51.7 LVH (LEFT VENTRICULAR HYPERTROPHY): ICD-10-CM

## 2018-10-10 DIAGNOSIS — R73.03 PREDIABETES: ICD-10-CM

## 2018-10-10 DIAGNOSIS — I42.8 CARDIOMYOPATHY, NONISCHEMIC (HCC): ICD-10-CM

## 2018-10-10 DIAGNOSIS — Z95.810 AICD (AUTOMATIC CARDIOVERTER/DEFIBRILLATOR) PRESENT: Primary | ICD-10-CM

## 2018-10-10 DIAGNOSIS — Z95.810 AICD (AUTOMATIC CARDIOVERTER/DEFIBRILLATOR) PRESENT: ICD-10-CM

## 2018-10-10 DIAGNOSIS — I10 ESSENTIAL HYPERTENSION WITH GOAL BLOOD PRESSURE LESS THAN 140/90: ICD-10-CM

## 2018-10-10 DIAGNOSIS — I46.9 CARDIAC ARREST (HCC): Primary | ICD-10-CM

## 2018-10-10 NOTE — PROGRESS NOTES
1. Have you been to the ER, urgent care clinic since your last visit? Hospitalized since your last visit? No    2. Have you seen or consulted any other health care providers outside of the [address] [place] [address] since your last visit? Include any pap smears or colon screening.  No

## 2018-10-10 NOTE — MR AVS SNAPSHOT
[facility] 
 
 
 [address] [address] 
[phone] Patient: Immanuel Griggs MRN: [id number] :1960 Visit Information Date & Time Provider Department Dept. Phone Encounter #  
 10/10/2018 10:30 AM Astrid Vaca  [facility] at [facility] [phone] [id number] Follow-up Instructions Return in about 6 months (around 4/10/2019). Your Appointments 10/10/2018 11:00 AM  
PROCEDURE with Pacer Norf Csi Cardio Specialist at [facility]) Appt Note: 6 months-[facility] [address]  
  
   
 [id number] [place] [address] [facility]  
  
    
 2018  9:30 AM  
PROCEDURE with Pacer Norf Csi Cardio Specialist at [facility]) Appt Note: 1 year in office device check -[facility] [address] [facility] [address] [address]  
[phone] Upcoming Health Maintenance Date Due Hepatitis C Screening 1960 Pneumococcal 19-64 Medium Risk (1 of 1 - PPSV23) 1981 DTaP/Tdap/Td series (1 - Tdap) 1983 PAP AKA CERVICAL CYTOLOGY 1983 Shingrix Vaccine Age 50> (1 of 2) 2012 BREAST CANCER SCRN MAMMOGRAM 2012 FOBT Q 1 YEAR AGE 50-75 2012 Influenza Age 5 to Adult 2018 Allergies as of 10/10/2018  Review Complete On: 10/10/2018 By: Edmond Buenrostro RN No Known Allergies Current Immunizations  Reviewed on 2016 No immunizations on file. Not reviewed this visit Vitals BP Pulse Height(growth percentile) Weight(growth percentile) SpO2 BMI  
 (!) 142/94 62 5' 7\" (1.702 m) 208 lb (94.3 kg) 98% 32.58 kg/m2 OB Status Smoking Status Menopause Former Smoker BMI and BSA Data Body Mass Index Body Surface Area 32.58 kg/m 2 2.11 m 2 Preferred Pharmacy Pharmacy Name Phone [facility] [address] [place] [address] [phone]. [phone] Your Updated Medication List  
  
   
This list is accurate as of 10/10/18 10:57 AM.  Always use your most recent med list. amLODIPine 10 mg tablet Commonly known as:  Herschell Hansa TAKE ONE TABLET BY MOUTH ONCE DAILY  
  
 aspirin delayed-release 81 mg tablet Take 2 Tabs by mouth daily. atorvastatin 10 mg tablet Commonly known as:  LIPITOR  
TAKE ONE TABLET BY MOUTH AT BEDTIME  
  
 carvedilol 25 mg tablet Commonly known as:  COREG  
TAKE ONE TABLET BY MOUTH TWICE DAILY WITH MEALS  
  
 lisinopril 20 mg tablet Commonly known as:  PRINIVIL, ZESTRIL  
TAKE ONE TABLET BY MOUTH ONCE DAILY  
  
 VITAMIN C 500 mg tablet Generic drug:  ascorbic acid (vitamin C) Take  by mouth. Follow-up Instructions Return in about 6 months (around 4/10/2019). Introducing [facility] SERVICES! [facility] introduces [employer] patient portal. Now you can access parts of your medical record, email your doctor's office, and request medication refills online. 1. In your internet browser, go to [email or web address] 2. Click on the First Time User? Click Here link in the Sign In box. You will see the New Member Sign Up page. 3. Enter your [employer] Access Code exactly as it appears below. You will not need to use this code after youve completed the sign-up process. If you do not sign up before the expiration date, you must request a new code. · [employer] Access Code: [email or web address] Expires: 1/8/2019 10:32 AM 
 
4. Enter the last four digits of your Social Security Number (xxxx) and Date of Birth (mm/dd/yyyy) as indicated and click Submit. You will be taken to the next sign-up page. 5. Create a [employer] ID. This will be your [employer] login ID and cannot be changed, so think of one that is secure and easy to remember. 6. Create a [employer] password. You can change your password at any time. 7. Enter your Password Reset Question and Answer. This can be used at a later time if you forget your password. 8. Enter your e-mail address. You will receive e-mail notification when new information is available in [address] 9. Click Sign Up. You can now view and download portions of your medical record. 10. Click the Download Summary menu link to download a portable copy of your medical information. If you have questions, please visit the Frequently Asked Questions section of the [employer] website. Remember, [employer] is NOT to be used for urgent needs. For medical emergencies, dial 911. Now available from your iPhone and Android! Please provide this summary of care documentation to your next provider. Your primary care clinician is listed as Meme Manzanares. If you have any questions after today's visit, please call [phone].

## 2018-10-21 NOTE — PROGRESS NOTES
Subjective:      Taylor Tyson is in the office today for cardiac reevaluation. She is a 16-year-old woman who had a cardiac arrest in the setting of hypokalemia on 05/02/2016. During her hospitalization, she was found to have an ejection fraction of only 25%. Prior to her discharge, her ejection fraction improved to 45-50%. She did have a previous cardiac catheterization done in 2014, which showed noncritical coronary artery disease. She had a complicated in-hospital course with acute kidney injury, altered mental status/encephalopathy, and aspiration pneumonia. She ultimately had an AICD placed by Dr. Toney Patel. The patient returned to work shortly after her hospital discharge. She has continued to do well without complaints. She is moving her work location to closer to her home. Her breathing has been \"okay\". She had no dizziness, near syncope or syncope. Her AICD will be interrogated today. Her blood pressure was mildly elevated in the office today. She maintains that when she checks her blood pressure out of the office that it is usually favorable. Patient Active Problem List    Diagnosis Date Noted    LVH (left ventricular hypertrophy) 07/21/2016    Cardiac arrest ([facility] 05/31/2016    AICD (automatic cardioverter/defibrillator) present 05/31/2016    Essential hypertension with goal blood pressure less than 140/90 05/18/2016    Prediabetes 05/18/2016    Mixed hyperlipidemia 05/18/2016    Hepatitis B carrier ([facility] 05/18/2016    Anemia 05/18/2016    Cardiomyopathy, nonischemic ([facility] 05/04/2016     Current Outpatient Medications   Medication Sig Dispense Refill    ascorbic acid, vitamin C, (VITAMIN C) 500 mg tablet Take  by mouth.       lisinopril (PRINIVIL, ZESTRIL) 20 mg tablet TAKE ONE TABLET BY MOUTH ONCE DAILY 90 Tab 3    carvedilol (COREG) 25 mg tablet TAKE ONE TABLET BY MOUTH TWICE DAILY WITH MEALS 180 Tab 3    amLODIPine (NORVASC) 10 mg tablet TAKE ONE TABLET BY MOUTH ONCE DAILY 90 Tab 3    aspirin delayed-release 81 mg tablet Take 2 Tabs by mouth daily. 180 Tab 3    atorvastatin (LIPITOR) 10 mg tablet TAKE ONE TABLET BY MOUTH AT BEDTIME 90 Tab 3     No Known Allergies  Past Medical History:   Diagnosis Date    Cardiac arrest with ventricular fibrillation ([address] 05/2016    s/p ICD    Diabetes ([facility]     Hypertension     Nonischemic cardiomyopathy ([address]      Past Surgical History:   Procedure Laterality Date    HX IMPLANTABLE CARDIOVERTER DEFIBRILLATOR  05/2016     Family History   Problem Relation Age of Onset    Heart Disease Mother 77    Diabetes Father     Stroke Maternal Grandmother      Social History     Tobacco Use   Smoking Status Former Smoker   Smokeless Tobacco Former User    Quit date: 5/2/2016          Review of Systems, additional:  Constitutional: negative  Eyes: negative  Respiratory: negative  Cardiovascular: negative  Gastrointestinal: negative  Musculoskeletal:negative  Neurological: negative  Behvioral/Psych: negative  Endocrine: negative  ENT: negative    Objective:     Visit Vitals  BP (!) 142/94   Pulse 62   Ht 5' 7\" (1.702 m)   Wt 208 lb (94.3 kg)   SpO2 98%   BMI 32.58 kg/m²     General:  alert, cooperative, no distress   Chest Wall: inspection normal - no chest wall deformities or tenderness, respiratory effort normal   Lung: clear to auscultation bilaterally   Heart:  normal rate and regular rhythm, S1 and S2 normal, no murmurs noted, no gallops noted   Abdomen: soft, non-tender. Bowel sounds normal. No masses,  no organomegaly   Extremities: extremities normal, atraumatic, no cyanosis or edema Skin: no rashes   Neuro: alert, oriented, normal speech, no focal findings or movement disorder noted         Assessment/Plan:       ICD-10-CM ICD-9-CM    1. Cardiomyopathy, nonischemic ([facility] [facility] last EF 45-50%. Stable. Return in 6 months I42.8 425.4    2. Cardiac arrest ([facility] post, 05/2016 I46.9 427.5    3.  Essential hypertension with goal blood pressure less than 035/71, systolic blood pressure mildly elevated in the office today. RT 6 mos. I10 401.9    4. LVH (left ventricular hypertrophy) I51.7 429.3    5. Hepatitis B carrier (HCC) B18.1 V02.61    6. Prediabetes R73.03 790.29    7. AICD (automatic cardioverter/defibrillator) present, interrogated in the office today Z95.810 V45.02    8.  Mixed hyperlipidemia E78.2 272.2

## 2019-03-29 RX ORDER — CARVEDILOL 25 MG/1
TABLET ORAL
Qty: 180 TAB | Refills: 3 | Status: SHIPPED | OUTPATIENT
Start: 2019-03-29 | End: 2020-03-31

## 2019-04-16 DIAGNOSIS — I10 ESSENTIAL HYPERTENSION WITH GOAL BLOOD PRESSURE LESS THAN 140/90: ICD-10-CM

## 2019-04-17 ENCOUNTER — OFFICE VISIT (OUTPATIENT)
Dept: CARDIOLOGY CLINIC | Age: 59
End: 2019-04-17

## 2019-04-17 DIAGNOSIS — Z95.810 AICD (AUTOMATIC CARDIOVERTER/DEFIBRILLATOR) PRESENT: ICD-10-CM

## 2019-04-17 DIAGNOSIS — I42.8 CARDIOMYOPATHY, NONISCHEMIC (HCC): Primary | ICD-10-CM

## 2019-04-17 RX ORDER — LISINOPRIL 20 MG/1
TABLET ORAL
Qty: 30 TAB | Refills: 11 | Status: SHIPPED | OUTPATIENT
Start: 2019-04-17 | End: 2020-05-09

## 2019-04-17 NOTE — PROGRESS NOTES
I have personally seen and evaluated the device findings. Interrogation reviewed and I agree with assessment.     Faye Titus

## 2019-05-16 ENCOUNTER — TELEPHONE (OUTPATIENT)
Dept: CARDIOLOGY CLINIC | Age: 59
End: 2019-05-16

## 2019-05-16 NOTE — TELEPHONE ENCOUNTER
Unable to leave message on voicemail, letter mailed regarding f/u appointments for Dr. Andrea Harrington and device check.

## 2019-05-31 RX ORDER — AMLODIPINE BESYLATE 10 MG/1
TABLET ORAL
Qty: 90 TAB | Refills: 3 | Status: SHIPPED | OUTPATIENT
Start: 2019-05-31 | End: 2019-06-14 | Stop reason: SDUPTHER

## 2019-06-14 ENCOUNTER — OFFICE VISIT (OUTPATIENT)
Dept: CARDIOLOGY CLINIC | Age: 59
End: 2019-06-14

## 2019-06-14 VITALS
DIASTOLIC BLOOD PRESSURE: 89 MMHG | HEART RATE: 77 BPM | OXYGEN SATURATION: 98 % | BODY MASS INDEX: 33.67 KG/M2 | SYSTOLIC BLOOD PRESSURE: 186 MMHG | WEIGHT: 215 LBS

## 2019-06-14 DIAGNOSIS — I51.7 LVH (LEFT VENTRICULAR HYPERTROPHY): ICD-10-CM

## 2019-06-14 DIAGNOSIS — Z95.810 AICD (AUTOMATIC CARDIOVERTER/DEFIBRILLATOR) PRESENT: ICD-10-CM

## 2019-06-14 DIAGNOSIS — E78.5 DYSLIPIDEMIA: Primary | ICD-10-CM

## 2019-06-14 DIAGNOSIS — R73.03 PREDIABETES: ICD-10-CM

## 2019-06-14 DIAGNOSIS — I46.9 CARDIAC ARREST (HCC): ICD-10-CM

## 2019-06-14 DIAGNOSIS — I10 ESSENTIAL HYPERTENSION WITH GOAL BLOOD PRESSURE LESS THAN 140/90: ICD-10-CM

## 2019-06-14 DIAGNOSIS — E78.2 MIXED HYPERLIPIDEMIA: ICD-10-CM

## 2019-06-14 DIAGNOSIS — I42.8 CARDIOMYOPATHY, NONISCHEMIC (HCC): ICD-10-CM

## 2019-06-14 NOTE — PATIENT INSTRUCTIONS
All lab work is completed at [facility] , [facility] No appointment required for lab work Hours are Mon-Fri 7:00 am-5:30 pm

## 2019-06-14 NOTE — PROGRESS NOTES
1. Have you been to the ER, urgent care clinic since your last visit? Hospitalized since your last visit? No    2. Have you seen or consulted any other health care providers outside of the [address] [place] [address] since your last visit? Include any pap smears or colon screening.   No

## 2019-06-19 RX ORDER — AMLODIPINE BESYLATE 10 MG/1
TABLET ORAL
Qty: 90 TAB | Refills: 3 | Status: SHIPPED | OUTPATIENT
Start: 2019-06-19 | End: 2020-09-04

## 2019-06-19 NOTE — PROGRESS NOTES
Subjective:      Mustapha Brady is in the office today for cardiac reevaluation. She is a 29-year-old woman who had a cardiac arrest in the setting of hypokalemia on 05/02/2016. During her hospitalization, she was found to have an ejection fraction of only 25%. Prior to her discharge, her ejection fraction improved to 45-50%. She did have a previous cardiac catheterization done in 2014, which showed noncritical coronary artery disease. She had a complicated in-hospital course with acute kidney injury, altered mental status/encephalopathy, and aspiration pneumonia. She ultimately had an AICD placed by Dr. Napoleon Aragon. The patient returned to work shortly after her hospital discharge. She has continued to do well without complaints. She is moving her work location to closer to her home. Her breathing has been \"okay\". She had no dizziness, near syncope or syncope. Her AICD will be interrogated today. Her blood pressure was mildly elevated in the office today. She maintains that when she checks her blood pressure out of the office that it is usually favorable. Patient Active Problem List    Diagnosis Date Noted    LVH (left ventricular hypertrophy) 07/21/2016    Cardiac arrest ([facility] [facility] 05/31/2016    AICD (automatic cardioverter/defibrillator) present 05/31/2016    Essential hypertension with goal blood pressure less than 140/90 05/18/2016    Prediabetes 05/18/2016    Mixed hyperlipidemia 05/18/2016    Hepatitis B carrier ([facility] 05/18/2016    Anemia 05/18/2016    Cardiomyopathy, nonischemic (HCC) 05/04/2016     Current Outpatient Medications   Medication Sig Dispense Refill    lisinopril (PRINIVIL, ZESTRIL) 20 mg tablet TAKE 1 TABLET BY MOUTH ONCE DAILY 30 Tab 11    carvedilol (COREG) 25 mg tablet TAKE 1 TABLET BY MOUTH TWICE DAILY WITH  MEALS 180 Tab 3    ascorbic acid, vitamin C, (VITAMIN C) 500 mg tablet Take  by mouth.       aspirin delayed-release 81 mg tablet Take 2 Tabs by mouth daily. 180 Tab 3    amLODIPine (NORVASC) 10 mg tablet TAKE 1 TABLET BY MOUTH ONCE DAILY 90 Tab 3     No Known Allergies  Past Medical History:   Diagnosis Date    [facility]2016    s/p ICD    Diabetes [facility]     Hypertension     Nonischemic cardiomyopathy ([facility]      Past Surgical History:   Procedure Laterality Date    HX IMPLANTABLE CARDIOVERTER DEFIBRILLATOR  05/2016     Family History   Problem Relation Age of Onset    Heart Disease Mother 77    Diabetes Father     Stroke Maternal Grandmother      Social History     Tobacco Use   Smoking Status Former Smoker   Smokeless Tobacco Former User    Quit date: 5/2/2016          Review of Systems, additional:  Constitutional: negative  Eyes: negative  Respiratory: negative  Cardiovascular: negative  Gastrointestinal: negative  Musculoskeletal:negative  Neurological: negative  Behvioral/Psych: negative  Endocrine: negative  ENT: negative    Objective:     Visit Vitals  /89   Pulse 77   Wt 215 lb (97.5 kg)   SpO2 98%   BMI 33.67 kg/m²     General:  alert, cooperative, no distress   Chest Wall: inspection normal - no chest wall deformities or tenderness, respiratory effort normal   Lung: clear to auscultation bilaterally   Heart:  normal rate and regular rhythm, S1 and S2 normal, no murmurs noted, no gallops noted   Abdomen: soft, non-tender. Bowel sounds normal. No masses,  no organomegaly   Extremities: extremities normal, atraumatic, no cyanosis or edema Skin: no rashes   Neuro: alert, oriented, normal speech, no focal findings or movement disorder noted         Assessment/Plan:       ICD-10-CM ICD-9-CM    1. Cardiomyopathy, nonischemic ([facility] last EF 45-50%. Stable. Return in 6 months I42.8 425.4    2. Cardiac arrest ([facility] post, 05/2016 I46.9 427.5    3. Essential hypertension with goal blood pressure less than 733/18, systolic blood pressure mildly elevated in the office today. RT 6 mos.  I10 401.9 4. LVH (left ventricular hypertrophy) I51.7 429.3    5. Hepatitis B carrier (HCC) B18.1 V02.61    6. Prediabetes R73.03 790.29    7. AICD (automatic cardioverter/defibrillator) present, interrogated in the office today Z95.810 V45.02    8.  Mixed hyperlipidemia E78.2 272.2

## 2019-06-23 NOTE — PROGRESS NOTES
Subjective:      Abad Olvera is in the office today for cardiac reevaluation. She is a 61-year-old woman who had a cardiac arrest in the setting of hypokalemia on 05/02/2016. During her hospitalization, she was found to have an ejection fraction of only 25%. Prior to her discharge, her ejection fraction improved to 45-50%. She did have a previous cardiac catheterization done in 2014, which showed noncritical coronary artery disease. She had a complicated in-hospital course with acute kidney injury, altered mental status/encephalopathy, and aspiration pneumonia. She ultimately had an AICD placed by Dr. Fernanda Mccarthy. Her blood pressure was markedly elevated in the office today. She has not been taking the amlodipine because it was too expensive. She has had no chest pain. She reports that her breathing is \"good\". No peripheral swelling. She is overdue for her lipid profile. Patient Active Problem List    Diagnosis Date Noted    LVH (left ventricular hypertrophy) 07/21/2016    Cardiac arrest ([facility] 05/31/2016    AICD (automatic cardioverter/defibrillator) present 05/31/2016    Essential hypertension with goal blood pressure less than 140/90 05/18/2016    Prediabetes 05/18/2016    Mixed hyperlipidemia 05/18/2016    Hepatitis B carrier ([facility] 05/18/2016    Anemia 05/18/2016    Cardiomyopathy, nonischemic (HCC) 05/04/2016     Current Outpatient Medications   Medication Sig Dispense Refill    lisinopril (PRINIVIL, ZESTRIL) 20 mg tablet TAKE 1 TABLET BY MOUTH ONCE DAILY 30 Tab 11    carvedilol (COREG) 25 mg tablet TAKE 1 TABLET BY MOUTH TWICE DAILY WITH  MEALS 180 Tab 3    ascorbic acid, vitamin C, (VITAMIN C) 500 mg tablet Take  by mouth.  aspirin delayed-release 81 mg tablet Take 2 Tabs by mouth daily.  180 Tab 3    amLODIPine (NORVASC) 10 mg tablet TAKE 1 TABLET BY MOUTH ONCE DAILY 90 Tab 3     No Known Allergies  Past Medical History:   Diagnosis Date    Cardiac arrest with ventricular fibrillation ([facility] 05/2016    s/p ICD    Diabetes ([facility]     Hypertension     [facility]      Past Surgical History:   Procedure Laterality Date    HX IMPLANTABLE CARDIOVERTER DEFIBRILLATOR  05/2016     Family History   Problem Relation Age of Onset    Heart Disease Mother 77    Diabetes Father     Stroke Maternal Grandmother      Social History     Tobacco Use   Smoking Status Former Smoker   Smokeless Tobacco Former User    Quit date: 5/2/2016          Review of Systems, additional:  Constitutional: negative  Eyes: negative  Respiratory: negative  Cardiovascular: negative  Gastrointestinal: negative  Musculoskeletal:negative  Neurological: negative  Behvioral/Psych: negative  Endocrine: negative  ENT: negative    Objective:     Visit Vitals  /89   Pulse 77   Wt 215 lb (97.5 kg)   SpO2 98%   BMI 33.67 kg/m²     General:  alert, cooperative, no distress   Chest Wall: inspection normal - no chest wall deformities or tenderness, respiratory effort normal   Lung: clear to auscultation bilaterally   Heart:  normal rate and regular rhythm, S1 and S2 normal, no murmurs noted, no gallops noted   Abdomen: soft, non-tender. Bowel sounds normal. No masses,  no organomegaly   Extremities: extremities normal, atraumatic, no cyanosis or edema Skin: no rashes   Neuro: alert, oriented, normal speech, no focal findings or movement disorder noted         Assessment/Plan:       ICD-10-CM ICD-9-CM    1. Cardiomyopathy, nonischemic ([facility] last EF 45-50%. Continues stable course. Return in 6 months I42.8 425.4    2. Cardiac arrest ([facility] post, 05/2016 I46.9 427.5    3. Essential hypertension with goal blood pressure less than 140/90, blood pressure markedly elevated in the office today. Patient was not taking amlodipine secondary to cost.  She was told where she could  get the medications at $4 a month. She will monitor her blood pressure at home. I10 401.9    4.  LVH (left ventricular hypertrophy) I51.7 429.3    5. Hepatitis B carrier (HCC) B18.1 V02.61    6. Prediabetes R73.03 790.29    7. AICD (automatic cardioverter/defibrillator) present, interrogated 10/2018 Z95.810 V45.02    8.  Mixed hyperlipidemia, will order lipid profile E78.2 272.2

## 2019-12-11 ENCOUNTER — CLINICAL SUPPORT (OUTPATIENT)
Dept: CARDIOLOGY CLINIC | Age: 59
End: 2019-12-11

## 2019-12-11 DIAGNOSIS — Z95.810 AICD (AUTOMATIC CARDIOVERTER/DEFIBRILLATOR) PRESENT: Primary | ICD-10-CM

## 2020-01-13 ENCOUNTER — OFFICE VISIT (OUTPATIENT)
Dept: CARDIOLOGY CLINIC | Age: 60
End: 2020-01-13

## 2020-01-13 VITALS
DIASTOLIC BLOOD PRESSURE: 90 MMHG | BODY MASS INDEX: 33.36 KG/M2 | HEART RATE: 65 BPM | WEIGHT: 213 LBS | SYSTOLIC BLOOD PRESSURE: 146 MMHG | OXYGEN SATURATION: 96 %

## 2020-01-13 DIAGNOSIS — Z86.74 HX OF CARDIAC ARREST: Primary | ICD-10-CM

## 2020-01-15 NOTE — PROGRESS NOTES
Subjective:      Sam Urena is in the office today for cardiac reevaluation. She is a 44-year-old woman who had a cardiac arrest in the setting of hypokalemia on 05/02/2016. During her hospitalization, she was found to have an ejection fraction of only 25%. Prior to her discharge, her ejection fraction improved to 45-50%. She did have a previous cardiac catheterization done in 2014, which showed noncritical coronary artery disease. She had a complicated in-hospital course with acute kidney injury, altered mental status/encephalopathy, and aspiration pneumonia. She ultimately had an AICD placed by Dr. Malick Schreiber. In the office today, she reports her blood pressure has been \"good \". She had a AICD interrogation on 12/11/2019. Longevity is 9.0 years. She has had no therapy. She has had no shortness of breath. She has had no chest pain. She has had no therapy. She has had no palpitations, near-syncope or syncope  Patient Active Problem List    Diagnosis Date Noted    LVH (left ventricular hypertrophy) 07/21/2016    Cardiac arrest ([facility] 05/31/2016    AICD (automatic cardioverter/defibrillator) present 05/31/2016    Essential hypertension with goal blood pressure less than 140/90 05/18/2016    Prediabetes 05/18/2016    Mixed hyperlipidemia 05/18/2016    Hepatitis B carrier ([facility] 05/18/2016    Anemia 05/18/2016    Cardiomyopathy, nonischemic (HCC) 05/04/2016     Current Outpatient Medications   Medication Sig Dispense Refill    amLODIPine (NORVASC) 10 mg tablet TAKE 1 TABLET BY MOUTH ONCE DAILY 90 Tab 3    lisinopril (PRINIVIL, ZESTRIL) 20 mg tablet TAKE 1 TABLET BY MOUTH ONCE DAILY 30 Tab 11    carvedilol (COREG) 25 mg tablet TAKE 1 TABLET BY MOUTH TWICE DAILY WITH  MEALS 180 Tab 3    ascorbic acid, vitamin C, (VITAMIN C) 500 mg tablet Take  by mouth.  aspirin delayed-release 81 mg tablet Take 2 Tabs by mouth daily.  180 Tab 3     No Known Allergies  Past Medical History: Diagnosis Date    [facility]2016    s/p ICD    Diabetes ([facility]     Hypertension     Nonischemic cardiomyopathy ([facility]      Past Surgical History:   Procedure Laterality Date    HX IMPLANTABLE CARDIOVERTER DEFIBRILLATOR  05/2016     Family History   Problem Relation Age of Onset    Heart Disease Mother 77    Diabetes Father     Stroke Maternal Grandmother      Social History     Tobacco Use   Smoking Status Former Smoker   Smokeless Tobacco Former User    Quit date: 5/2/2016          Review of Systems, additional:  Constitutional: negative  Eyes: negative  Respiratory: negative  Cardiovascular: negative  Gastrointestinal: negative  Musculoskeletal:negative  Neurological: negative  Behvioral/Psych: negative  Endocrine: negative  ENT: negative    Objective:     Visit Vitals  /90   Pulse 65   Ht (P) 5' 7\" (1.702 m)   Wt 96.6 kg (213 lb)   SpO2 96%   BMI (P) 33.36 kg/m²     General:  alert, cooperative, no distress   Chest Wall: inspection normal - no chest wall deformities or tenderness, respiratory effort normal   Lung: clear to auscultation bilaterally   Heart:  normal rate and regular rhythm, S1 and S2 normal, no murmurs noted, no gallops noted   Abdomen: soft, non-tender. Bowel sounds normal. No masses,  no organomegaly   Extremities: extremities normal, atraumatic, no cyanosis or edema Skin: no rashes   Neuro: alert, oriented, normal speech, no focal findings or movement disorder noted         Assessment/Plan:       ICD-10-CM ICD-9-CM    1. Cardiomyopathy, nonischemic ([facility] last EF 45-50%. Stable. Return in 6 months I42.8 425.4    2. Cardiac arrest ([facility] post, 05/2016 I46.9 427.5    3. Essential hypertension with goal blood pressure less than 140/90, blood pressure mildly elevated in the office today. I10 401.9    4. LVH (left ventricular hypertrophy) I51.7 429.3    5. Hepatitis B carrier (HCC) B18.1 V02.61    6. Prediabetes R73.03 790.29    7.  AICD (automatic cardioverter/defibrillator) present, interrogated 12/11/2019 Z95.810 V45.02    8.  Mixed hyperlipidemia E78.2 272.2

## 2020-03-31 RX ORDER — CARVEDILOL 25 MG/1
TABLET ORAL
Qty: 60 TAB | Refills: 0 | Status: SHIPPED | OUTPATIENT
Start: 2020-03-31 | End: 2020-04-09

## 2020-04-09 RX ORDER — CARVEDILOL 25 MG/1
TABLET ORAL
Qty: 60 TAB | Refills: 0 | Status: SHIPPED | OUTPATIENT
Start: 2020-04-09 | End: 2020-05-09

## 2020-05-05 DIAGNOSIS — I10 ESSENTIAL HYPERTENSION WITH GOAL BLOOD PRESSURE LESS THAN 140/90: ICD-10-CM

## 2020-05-09 RX ORDER — CARVEDILOL 25 MG/1
TABLET ORAL
Qty: 60 TAB | Refills: 0 | Status: SHIPPED | OUTPATIENT
Start: 2020-05-09 | End: 2020-08-10 | Stop reason: SDUPTHER

## 2020-05-09 RX ORDER — LISINOPRIL 20 MG/1
TABLET ORAL
Qty: 30 TAB | Refills: 0 | Status: SHIPPED | OUTPATIENT
Start: 2020-05-09 | End: 2020-06-12

## 2020-06-09 DIAGNOSIS — I10 ESSENTIAL HYPERTENSION WITH GOAL BLOOD PRESSURE LESS THAN 140/90: ICD-10-CM

## 2020-06-12 RX ORDER — LISINOPRIL 20 MG/1
TABLET ORAL
Qty: 30 TAB | Refills: 0 | Status: SHIPPED | OUTPATIENT
Start: 2020-06-12 | End: 2020-07-16 | Stop reason: SDUPTHER

## 2020-07-16 DIAGNOSIS — I10 ESSENTIAL HYPERTENSION WITH GOAL BLOOD PRESSURE LESS THAN 140/90: ICD-10-CM

## 2020-07-16 RX ORDER — LISINOPRIL 20 MG/1
TABLET ORAL
Qty: 30 TAB | Refills: 5 | Status: SHIPPED | OUTPATIENT
Start: 2020-07-16 | End: 2020-12-26

## 2020-07-16 NOTE — TELEPHONE ENCOUNTER
PCP: Esperanza Smith MD    Last appt: 1/13/2020  Future Appointments   Date Time Provider Gris Gandhi   7/22/2020  9:45 AM C[facility], PACER [facility] [address] [facility]   8/10/2020 10:00 AM Bridget Oviedo MD [address] [place] [address]   9/9/2020  1:20 PM CSI, PACER [facility] DINORAH SCHED       Requested Prescriptions     Pending Prescriptions Disp Refills    lisinopriL (PRINIVIL, ZESTRIL) 20 mg tablet 30 Tab 5

## 2020-07-16 NOTE — TELEPHONE ENCOUNTER
Requested Prescriptions     Pending Prescriptions Disp Refills    lisinopriL (PRINIVIL, ZESTRIL) 20 mg tablet 30 Tab 0

## 2020-07-22 ENCOUNTER — CLINICAL SUPPORT (OUTPATIENT)
Dept: CARDIOLOGY CLINIC | Age: 60
End: 2020-07-22

## 2020-07-22 DIAGNOSIS — I42.8 CARDIOMYOPATHY, NONISCHEMIC (HCC): Primary | ICD-10-CM

## 2020-07-22 DIAGNOSIS — Z95.810 AICD (AUTOMATIC CARDIOVERTER/DEFIBRILLATOR) PRESENT: ICD-10-CM

## 2020-08-10 ENCOUNTER — HOSPITAL ENCOUNTER (OUTPATIENT)
Dept: LAB | Age: 60
Discharge: HOME | End: 2020-08-10

## 2020-08-10 ENCOUNTER — OFFICE VISIT (OUTPATIENT)
Dept: CARDIOLOGY CLINIC | Age: 60
End: 2020-08-10

## 2020-08-10 VITALS
BODY MASS INDEX: 35.02 KG/M2 | HEART RATE: 57 BPM | TEMPERATURE: 98 F | OXYGEN SATURATION: 98 % | RESPIRATION RATE: 18 BRPM | DIASTOLIC BLOOD PRESSURE: 90 MMHG | SYSTOLIC BLOOD PRESSURE: 157 MMHG | WEIGHT: 223.6 LBS

## 2020-08-10 DIAGNOSIS — Z86.74 HISTORY OF CARDIAC ARREST: Primary | ICD-10-CM

## 2020-08-10 LAB — SENTARA SPECIMEN COL,SENBCF: NORMAL

## 2020-08-10 PROCEDURE — 99001 SPECIMEN HANDLING PT-LAB: CPT

## 2020-08-10 NOTE — PROGRESS NOTES
Dewayne Bartlett presents today for No chief complaint on file. Dewayne Bartlett preferred language for health care discussion is english/other. Is someone accompanying this pt? n    Is the patient using any DME equipment during OV? n    Depression Screening:  3 most recent PHQ Screens 6/14/2019   Little interest or pleasure in doing things Not at all   Feeling down, depressed, irritable, or hopeless Not at all   Total Score PHQ 2 0       Learning Assessment:  Learning Assessment 5/18/2016   PRIMARY LEARNER Patient   HIGHEST LEVEL OF EDUCATION - PRIMARY LEARNER  SOME COLLEGE   BARRIERS PRIMARY LEARNER NONE   PRIMARY LANGUAGE ENGLISH   LEARNER PREFERENCE PRIMARY LISTENING   ANSWERED BY patient   RELATIONSHIP SELF       Abuse Screening:  Abuse Screening Questionnaire 5/18/2016   Do you ever feel afraid of your partner? N   Are you in a relationship with someone who physically or mentally threatens you? N   Is it safe for you to go home? Y       Fall Risk  No flowsheet data found. Pt currently taking Anticoagulant therapy? n    Coordination of Care:  1. Have you been to the ER, urgent care clinic since your last visit? Hospitalized since your last visit? n    2. Have you seen or consulted any other health care providers outside of the [address] [place] [address] since your last visit? Include any pap smears or colon screening.  n

## 2020-08-11 LAB
CHOLEST SERPL-MCNC: 211 MG/DL (ref 110–200)
HDLC SERPL-MCNC: 3.8 MG/DL (ref 0–5)
HDLC SERPL-MCNC: 56 MG/DL
LDL/HDL RATIO,LDHD: 2.4
LDLC SERPL CALC-MCNC: 134 MG/DL (ref 50–99)
NON-HDL CHOLESTEROL, 011976: 155 MG/DL
TRIGL SERPL-MCNC: 102 MG/DL (ref 40–149)
VLDLC SERPL CALC-MCNC: 20 MG/DL (ref 8–30)

## 2020-08-11 RX ORDER — CARVEDILOL 25 MG/1
TABLET ORAL
Qty: 180 TAB | Refills: 3 | Status: SHIPPED | OUTPATIENT
Start: 2020-08-11 | End: 2021-09-01

## 2020-08-15 NOTE — PROGRESS NOTES
Subjective:      Giuseppe Sheldon is in the office today for cardiac reevaluation. She is a 53-year-old woman who had a cardiac arrest in the setting of hypokalemia on 05/02/2016. During her hospitalization, she was found to have an ejection fraction of only 25%. Prior to her discharge, her ejection fraction improved to 45-50%. She did have a previous cardiac catheterization done in 2014, which showed noncritical coronary artery disease. She had a complicated in-hospital course with acute kidney injury, altered mental status/encephalopathy, and aspiration pneumonia. She ultimately had an AICD placed by Dr. Noa Kerns. In the office today, she reports that she is feeling \"good\". She did get a home monitor for her blood pressure but has not been using it yet. She wanted to make sure the monitor was acceptable for checking her blood pressures. .  She has been walking 2 miles a day with her sister. She did return to work on 6/3/2020. She has gained 10 pounds since her last visit of  1/13/20. Her ICD was recently interrogated. Patient Active Problem List    Diagnosis Date Noted    LVH (left ventricular hypertrophy) 07/21/2016    Cardiac arrest ([facility] [address] 05/31/2016    AICD (automatic cardioverter/defibrillator) present 05/31/2016    Essential hypertension with goal blood pressure less than 140/90 05/18/2016    Prediabetes 05/18/2016    Mixed hyperlipidemia 05/18/2016    Hepatitis B carrier ([facility] [address] 05/18/2016    Anemia 05/18/2016    Cardiomyopathy, nonischemic (HCC) 05/04/2016     Current Outpatient Medications   Medication Sig Dispense Refill    lisinopriL (PRINIVIL, ZESTRIL) 20 mg tablet 1 daily 30 Tab 5    amLODIPine (NORVASC) 10 mg tablet TAKE 1 TABLET BY MOUTH ONCE DAILY 90 Tab 3    ascorbic acid, vitamin C, (VITAMIN C) 500 mg tablet Take  by mouth.  aspirin delayed-release 81 mg tablet Take 2 Tabs by mouth daily.  180 Tab 3    carvediloL (COREG) 25 mg tablet Take 1 p.o. twice daily 180 Tab 3     No Known Allergies  Past Medical History:   Diagnosis Date    Cardiac arrest with ventricular fibrillation ([address] 05/2016    s/p ICD    Diabetes ([facility] [address]     Hypertension     Nonischemic cardiomyopathy ([facility] [address]      Past Surgical History:   Procedure Laterality Date    HX IMPLANTABLE CARDIOVERTER DEFIBRILLATOR  05/2016     Family History   Problem Relation Age of Onset    Heart Disease Mother 77    Diabetes Father     Stroke Maternal Grandmother      Social History     Tobacco Use   Smoking Status Former Smoker   Smokeless Tobacco Former User    Quit date: 5/2/2016          Review of Systems, additional:  Constitutional: negative  Eyes: negative  Respiratory: negative  Cardiovascular: negative  Gastrointestinal: negative  Musculoskeletal:negative  Neurological: negative  Behvioral/Psych: negative  Endocrine: negative  ENT: negative    Objective:     Visit Vitals  /90 (BP 1 Location: Left arm, BP Patient Position: Sitting)   Pulse (!) 57   Temp 98 °F (36.7 °C) (Temporal)   Resp 18   Wt 223 lb 9.6 oz (101.4 kg)   SpO2 98%   BMI (P) 35.02 kg/m²     General:  alert, cooperative, no distress   Chest Wall: inspection normal - no chest wall deformities or tenderness, respiratory effort normal   Lung: clear to auscultation bilaterally   Heart:  normal rate and regular rhythm, S1 and S2 normal, no murmurs noted, no gallops noted   Abdomen: soft, non-tender. Bowel sounds normal. No masses,  no organomegaly   Extremities: extremities normal, atraumatic, no cyanosis or edema Skin: no rashes   Neuro: alert, oriented, normal speech, no focal findings or movement disorder noted         Assessment/Plan:       ICD-10-CM ICD-9-CM    1. Cardiomyopathy, nonischemic ([facility] [address] last EF 45-50%. Stable. Return in 6 months I42.8 425.4    2. Cardiac arrest ([address] post, 05/2016 I46.9 427.5    3. Essential hypertension with goal blood pressure less than 140/90, blood pressure mildly elevated in the office today.   Recommended patient measure her blood pressures at home and keep a log. I10 401.9    4. LVH (left ventricular hypertrophy) I51.7 429.3    5. Hepatitis B carrier (HCC) B18.1 V02.61    6. Prediabetes R73.03 790.29    7. AICD (automatic cardioverter/defibrillator) present, Medtronic Evera XT VR last evaluated on 7/22/2020. Z95.810 V45.02    8.  Mixed hyperlipidemia E78.2 272.2

## 2020-09-04 RX ORDER — AMLODIPINE BESYLATE 10 MG/1
TABLET ORAL
Qty: 90 TAB | Refills: 3 | Status: SHIPPED | OUTPATIENT
Start: 2020-09-04 | End: 2021-09-27

## 2020-09-04 NOTE — TELEPHONE ENCOUNTER
PCP: Keyla Arguello MD    Last appt: 8/10/2020  Future Appointments   Date Time Provider Gris Gandhi   10/21/2020  1:00 PM CSI, PACER [facility] SCHED   1/27/2021  1:00 PM CSI, [facility] SCHED   4/28/2021  1:00 PM CSI, [facility] D[facility] SCHED       Requested Prescriptions     Pending Prescriptions Disp Refills    amLODIPine (NORVASC) 10 mg tablet [Pharmacy Med Name: amLODIPine Besylate 10 MG Oral Tablet] 90 Tab 0     Sig: Take 1 tablet by mouth once daily           Other Comments:  Dr Ella Evans pt.

## 2020-10-21 ENCOUNTER — OFFICE VISIT (OUTPATIENT)
Dept: CARDIOLOGY CLINIC | Age: 60
End: 2020-10-21
Payer: MEDICAID

## 2020-10-21 DIAGNOSIS — I42.8 CARDIOMYOPATHY, NONISCHEMIC (HCC): Primary | ICD-10-CM

## 2020-10-21 DIAGNOSIS — Z95.810 AICD (AUTOMATIC CARDIOVERTER/DEFIBRILLATOR) PRESENT: ICD-10-CM

## 2020-10-21 DIAGNOSIS — Z86.74 HX OF CARDIAC ARREST: ICD-10-CM

## 2020-10-21 PROCEDURE — 93295 DEV INTERROG REMOTE 1/2/MLT: CPT | Performed by: INTERNAL MEDICINE

## 2020-11-10 NOTE — PROGRESS NOTES
I have personally seen and evaluated the device findings. Interrogation reviewed and I agree with assessment.     Chaim Anglin

## 2020-12-26 DIAGNOSIS — I10 ESSENTIAL HYPERTENSION WITH GOAL BLOOD PRESSURE LESS THAN 140/90: ICD-10-CM

## 2020-12-26 RX ORDER — LISINOPRIL 20 MG/1
TABLET ORAL
Qty: 90 TAB | Refills: 0 | Status: SHIPPED | OUTPATIENT
Start: 2020-12-26 | End: 2021-01-28

## 2021-01-27 ENCOUNTER — OFFICE VISIT (OUTPATIENT)
Dept: CARDIOLOGY CLINIC | Age: 61
End: 2021-01-27
Payer: MEDICAID

## 2021-01-27 DIAGNOSIS — I42.8 CARDIOMYOPATHY, NONISCHEMIC (HCC): Primary | ICD-10-CM

## 2021-01-27 DIAGNOSIS — I10 ESSENTIAL HYPERTENSION WITH GOAL BLOOD PRESSURE LESS THAN 140/90: ICD-10-CM

## 2021-01-27 DIAGNOSIS — Z95.810 AICD (AUTOMATIC CARDIOVERTER/DEFIBRILLATOR) PRESENT: ICD-10-CM

## 2021-01-27 PROCEDURE — 93296 REM INTERROG EVL PM/IDS: CPT | Performed by: INTERNAL MEDICINE

## 2021-01-27 PROCEDURE — 93295 DEV INTERROG REMOTE 1/2/MLT: CPT | Performed by: INTERNAL MEDICINE

## 2021-01-28 RX ORDER — LISINOPRIL 20 MG/1
TABLET ORAL
Qty: 90 TAB | Refills: 0 | Status: SHIPPED | OUTPATIENT
Start: 2021-01-28 | End: 2021-05-04

## 2021-02-02 NOTE — PROGRESS NOTES
I have personally seen and evaluated the device findings. Interrogation reviewed and I agree with assessment.     Magalis Peña

## 2021-04-28 ENCOUNTER — OFFICE VISIT (OUTPATIENT)
Dept: CARDIOLOGY CLINIC | Age: 61
End: 2021-04-28
Payer: MEDICAID

## 2021-04-28 DIAGNOSIS — I42.8 CARDIOMYOPATHY, NONISCHEMIC (HCC): Primary | ICD-10-CM

## 2021-04-28 DIAGNOSIS — Z95.810 AICD (AUTOMATIC CARDIOVERTER/DEFIBRILLATOR) PRESENT: ICD-10-CM

## 2021-04-28 PROCEDURE — 93295 DEV INTERROG REMOTE 1/2/MLT: CPT | Performed by: INTERNAL MEDICINE

## 2021-04-28 PROCEDURE — 93296 REM INTERROG EVL PM/IDS: CPT | Performed by: INTERNAL MEDICINE

## 2021-05-03 DIAGNOSIS — I10 ESSENTIAL HYPERTENSION WITH GOAL BLOOD PRESSURE LESS THAN 140/90: ICD-10-CM

## 2021-05-04 RX ORDER — LISINOPRIL 20 MG/1
TABLET ORAL
Qty: 90 TAB | Refills: 0 | Status: SHIPPED | OUTPATIENT
Start: 2021-05-04 | End: 2021-08-11

## 2021-05-11 NOTE — PROGRESS NOTES
I have personally seen and evaluated the device findings. Interrogation reviewed and I agree with assessment.     Shayan Encarnacion

## 2021-05-12 ENCOUNTER — OFFICE VISIT (OUTPATIENT)
Dept: CARDIOLOGY CLINIC | Age: 61
End: 2021-05-12
Payer: MEDICAID

## 2021-05-12 VITALS
RESPIRATION RATE: 18 BRPM | WEIGHT: 222 LBS | DIASTOLIC BLOOD PRESSURE: 79 MMHG | OXYGEN SATURATION: 98 % | HEART RATE: 56 BPM | HEIGHT: 67 IN | SYSTOLIC BLOOD PRESSURE: 137 MMHG | BODY MASS INDEX: 34.84 KG/M2 | TEMPERATURE: 98 F

## 2021-05-12 DIAGNOSIS — I46.9 CARDIAC ARREST (HCC): Primary | ICD-10-CM

## 2021-05-12 PROCEDURE — 99214 OFFICE O/P EST MOD 30 MIN: CPT | Performed by: INTERNAL MEDICINE

## 2021-05-17 NOTE — PROGRESS NOTES
Subjective:      Jazz Awan is in the office today for cardiac reevaluation. She is a 81-year-old woman who had a cardiac arrest in the setting of hypokalemia on 05/02/2016. During her hospitalization, she was found to have an ejection fraction of only 25%. Prior to her discharge, her ejection fraction improved to 45-50%. She did have a previous cardiac catheterization done in 2014, which showed noncritical coronary artery disease. She had a complicated in-hospital course with acute kidney injury, altered mental status/encephalopathy, and aspiration pneumonia. She ultimately had an AICD placed by Dr. Les Schirmer. In the office today, she reports that she is doing well. She has had no chest pain or shortness of breath. She did have a Covid infection since her last visit. She is just starting to get back into her walking regimen. She has had no peripheral swelling. She has had no PND or orthopnea. She has had no palpitations, near-syncope or syncope. .  Patient Active Problem List    Diagnosis Date Noted    LVH (left ventricular hypertrophy) 07/21/2016    Cardiac arrest ([facility] [address] 05/31/2016    AICD (automatic cardioverter/defibrillator) present 05/31/2016    Essential hypertension with goal blood pressure less than 140/90 05/18/2016    Prediabetes 05/18/2016    Mixed hyperlipidemia 05/18/2016    Hepatitis B carrier ([facility] [address] 05/18/2016    Anemia 05/18/2016    Cardiomyopathy, nonischemic (HCC) 05/04/2016     Current Outpatient Medications   Medication Sig Dispense Refill    lisinopriL (PRINIVIL, ZESTRIL) 20 mg tablet Take 1 tablet by mouth once daily 90 Tab 0    amLODIPine (NORVASC) 10 mg tablet Take 1 tablet by mouth once daily 90 Tab 3    carvediloL (COREG) 25 mg tablet Take 1 p.o. twice daily 180 Tab 3    ascorbic acid, vitamin C, (VITAMIN C) 500 mg tablet Take  by mouth.  aspirin delayed-release 81 mg tablet Take 2 Tabs by mouth daily.  180 Tab 3     No Known Allergies  Past Medical History:   Diagnosis Date    Cardiac arrest with ventricular fibrillation ([facility] [address] 05/2016    s/p ICD    Diabetes ([facility] [address]     Hypertension     Nonischemic cardiomyopathy ([facility] [address]      Past Surgical History:   Procedure Laterality Date    HX IMPLANTABLE CARDIOVERTER DEFIBRILLATOR  05/2016     Family History   Problem Relation Age of Onset    Heart Disease Mother 77    Diabetes Father     Stroke Maternal Grandmother      Social History     Tobacco Use   Smoking Status Former Smoker   Smokeless Tobacco Former User    Quit date: 5/2/2016          Review of Systems, additional:  Constitutional: negative  Eyes: negative  Respiratory: negative  Cardiovascular: negative  Gastrointestinal: negative  Musculoskeletal:negative  Neurological: negative  Behvioral/Psych: negative  Endocrine: negative  ENT: negative    Objective:     Visit Vitals  /79 (BP 1 Location: Left upper arm, BP Patient Position: Sitting, BP Cuff Size: Large adult)   Pulse (!) 56   Temp 98 °F (36.7 °C) (Temporal)   Resp 18   Ht 5' 7\" (1.702 m)   Wt 100.7 kg (222 lb)   SpO2 98%   BMI 34.77 kg/m²     General:  alert, cooperative, no distress   Chest Wall: inspection normal - no chest wall deformities or tenderness, respiratory effort normal   Lung: clear to auscultation bilaterally   Heart:  normal rate and regular rhythm, S1 and S2 normal, no murmurs noted, no gallops noted   Abdomen: soft, non-tender. Bowel sounds normal. No masses,  no organomegaly   Extremities: extremities normal, atraumatic, no cyanosis or edema Skin: no rashes   Neuro: alert, oriented, normal speech, no focal findings or movement disorder noted         Assessment/Plan:       ICD-10-CM ICD-9-CM    1. Cardiomyopathy, nonischemic ([facility] [address] last EF 45-50%. Stable. Return in 6 months I42.8 425.4    2. Cardiac arrest ([facility] [address] post, 05/2016 I46.9 427.5    3. Essential hypertension with goal blood pressure less than 140/90, blood pressure controlled in the office today. I10 401.9    4.  LVH (left ventricular hypertrophy) I51.7 429.3    5. Hepatitis B carrier (HCC) B18.1 V02.61    6. Prediabetes R73.03 790.29    7. AICD (automatic cardioverter/defibrillator) present, Medtronic Evera XT VR last evaluated on 7/22/2020. Return in July for AICD check Z95.810 V45.02    8.  Mixed hyperlipidemia E78.2 272.2

## 2021-07-28 ENCOUNTER — OFFICE VISIT (OUTPATIENT)
Dept: CARDIOLOGY CLINIC | Age: 61
End: 2021-07-28
Payer: MEDICAID

## 2021-07-28 DIAGNOSIS — I42.8 CARDIOMYOPATHY, NONISCHEMIC (HCC): Primary | ICD-10-CM

## 2021-07-28 DIAGNOSIS — Z95.810 AICD (AUTOMATIC CARDIOVERTER/DEFIBRILLATOR) PRESENT: ICD-10-CM

## 2021-07-28 PROCEDURE — 93296 REM INTERROG EVL PM/IDS: CPT | Performed by: INTERNAL MEDICINE

## 2021-07-28 PROCEDURE — 93295 DEV INTERROG REMOTE 1/2/MLT: CPT | Performed by: INTERNAL MEDICINE

## 2021-07-29 NOTE — PROGRESS NOTES
I have personally seen and evaluated the device findings. Interrogation reviewed and I agree with assessment.     Cordelia Hannah

## 2021-08-10 DIAGNOSIS — I10 ESSENTIAL HYPERTENSION WITH GOAL BLOOD PRESSURE LESS THAN 140/90: ICD-10-CM

## 2021-08-11 RX ORDER — LISINOPRIL 20 MG/1
TABLET ORAL
Qty: 90 TABLET | Refills: 0 | Status: SHIPPED | OUTPATIENT
Start: 2021-08-11 | End: 2021-11-15

## 2021-08-17 ENCOUNTER — CLINICAL SUPPORT (OUTPATIENT)
Dept: CARDIOLOGY CLINIC | Age: 61
End: 2021-08-17
Payer: MEDICAID

## 2021-08-17 DIAGNOSIS — Z95.810 AICD (AUTOMATIC CARDIOVERTER/DEFIBRILLATOR) PRESENT: Primary | ICD-10-CM

## 2021-08-17 DIAGNOSIS — I46.9 CARDIAC ARREST (HCC): ICD-10-CM

## 2021-08-17 DIAGNOSIS — I42.8 CARDIOMYOPATHY, NONISCHEMIC (HCC): ICD-10-CM

## 2021-08-17 PROCEDURE — 93282 PRGRMG EVAL IMPLANTABLE DFB: CPT | Performed by: INTERNAL MEDICINE

## 2021-09-01 RX ORDER — CARVEDILOL 25 MG/1
TABLET ORAL
Qty: 180 TABLET | Refills: 0 | Status: SHIPPED | OUTPATIENT
Start: 2021-09-01 | End: 2022-03-16

## 2021-09-27 RX ORDER — AMLODIPINE BESYLATE 10 MG/1
TABLET ORAL
Qty: 90 TABLET | Refills: 0 | Status: SHIPPED | OUTPATIENT
Start: 2021-09-27 | End: 2022-01-05 | Stop reason: SDUPTHER

## 2021-11-14 DIAGNOSIS — I10 ESSENTIAL HYPERTENSION WITH GOAL BLOOD PRESSURE LESS THAN 140/90: ICD-10-CM

## 2021-11-15 RX ORDER — LISINOPRIL 20 MG/1
TABLET ORAL
Qty: 90 TABLET | Refills: 0 | Status: SHIPPED | OUTPATIENT
Start: 2021-11-15 | End: 2021-11-16 | Stop reason: SDUPTHER

## 2021-11-16 DIAGNOSIS — I10 ESSENTIAL HYPERTENSION WITH GOAL BLOOD PRESSURE LESS THAN 140/90: ICD-10-CM

## 2021-11-16 RX ORDER — LISINOPRIL 20 MG/1
20 TABLET ORAL DAILY
Qty: 90 TABLET | Refills: 2 | Status: SHIPPED | OUTPATIENT
Start: 2021-11-16

## 2021-11-16 NOTE — TELEPHONE ENCOUNTER
PCP: None    Last appt: 8/17/2021  Future Appointments   Date Time Provider Gris Gandhi   11/24/2021  3:00 PM CSI, PACER [facility]   5/13/2022  9:15 AM Maulik Phipps MD [facility] BS AMB   8/16/2022  9:15 AM CSI, PACER [facility] BS AMB       Requested Prescriptions     Pending Prescriptions Disp Refills    lisinopriL (PRINIVIL, ZESTRIL) 20 mg tablet 90 Tablet 2     Sig: Take 1 Tablet by mouth daily.

## 2022-01-05 NOTE — TELEPHONE ENCOUNTER
Requested Prescriptions     Pending Prescriptions Disp Refills    amLODIPine (NORVASC) 10 mg tablet 90 Tablet 0     Sig: Take 1 Tablet by mouth daily.

## 2022-01-05 NOTE — TELEPHONE ENCOUNTER
PCP: None    Last appt: 8/17/2021  Future Appointments   Date Time Provider Gris Gandhi   5/13/2022  9:15 AM Yong Ramirez MD [facility] BS AMB   8/16/2022  9:15 AM CSI, PACER NOR[facility] BS AMB       Requested Prescriptions     Pending Prescriptions Disp Refills    amLODIPine (NORVASC) 10 mg tablet 90 Tablet 1     Sig: Take 1 Tablet by mouth daily.

## 2022-01-06 RX ORDER — AMLODIPINE BESYLATE 10 MG/1
10 TABLET ORAL DAILY
Qty: 90 TABLET | Refills: 1 | Status: SHIPPED | OUTPATIENT
Start: 2022-01-06 | End: 2022-07-17

## 2022-02-07 ENCOUNTER — TELEPHONE (OUTPATIENT)
Dept: CARDIOLOGY CLINIC | Age: 62
End: 2022-02-07

## 2022-02-07 NOTE — TELEPHONE ENCOUNTER
Patient alert for lead intergity has alarmed to the patient. Called patient to come into office on Wednesday or Thursday for device check . She is going to call me back to see if she can get a ride.

## 2022-02-07 NOTE — TELEPHONE ENCOUNTER
Patient says that her device went off 2 times on Friday, 3 times on Sunday, and then once today.  Patient has not pain but just wants to know what the problem is

## 2022-02-09 ENCOUNTER — OFFICE VISIT (OUTPATIENT)
Dept: CARDIOLOGY CLINIC | Age: 62
End: 2022-02-09
Payer: MEDICAID

## 2022-02-09 ENCOUNTER — HOSPITAL ENCOUNTER (OUTPATIENT)
Dept: GENERAL RADIOLOGY | Age: 62
Discharge: HOME | End: 2022-02-09
Payer: MEDICAID

## 2022-02-09 ENCOUNTER — CLINICAL SUPPORT (OUTPATIENT)
Dept: CARDIOLOGY CLINIC | Age: 62
End: 2022-02-09

## 2022-02-09 DIAGNOSIS — I10 ESSENTIAL HYPERTENSION WITH GOAL BLOOD PRESSURE LESS THAN 140/90: ICD-10-CM

## 2022-02-09 DIAGNOSIS — I10 PRIMARY HYPERTENSION: ICD-10-CM

## 2022-02-09 DIAGNOSIS — Z95.810 AICD (AUTOMATIC CARDIOVERTER/DEFIBRILLATOR) PRESENT: Primary | ICD-10-CM

## 2022-02-09 DIAGNOSIS — I46.9 CARDIAC ARREST (HCC): ICD-10-CM

## 2022-02-09 DIAGNOSIS — T82.110A AICD LEAD MALFUNCTION: ICD-10-CM

## 2022-02-09 DIAGNOSIS — Z86.74 HISTORY OF CARDIAC ARREST: ICD-10-CM

## 2022-02-09 DIAGNOSIS — Z95.810 AICD (AUTOMATIC CARDIOVERTER/DEFIBRILLATOR) PRESENT: ICD-10-CM

## 2022-02-09 PROCEDURE — 71046 X-RAY EXAM CHEST 2 VIEWS: CPT

## 2022-02-09 PROCEDURE — 99215 OFFICE O/P EST HI 40 MIN: CPT | Performed by: INTERNAL MEDICINE

## 2022-02-09 PROCEDURE — 93282 PRGRMG EVAL IMPLANTABLE DFB: CPT | Performed by: INTERNAL MEDICINE

## 2022-02-09 NOTE — PROGRESS NOTES
HPI: Patient is a 45-year-old female who normally follows with Dr. Ольга Oneil. She is here because her AICD started alarming on Saturday. Upon device review she has had 83 episodes of short V to V consistent and concerning for possible impending RV lead failure since this weekend. Her impedances, sensing and thresholds are stable. She has a Sprint Quatro lead placed in 2016 for secondary prevention post cardiac arrest.  She has not had any new medication changes. No new changes in activity level. No interference at home in regards to electronics that is new that she is aware of. She denies any new chest pain dyspnea PND orthopnea or edema. No syncope, fevers, chills. Impression:  -Impending Medtronic Sprint Quattro AICD lead failure with multiple early sensed events, short V to V intervals not directly corresponding to any ventricular activity. Stable impedance, sensing. Not device dependent. -s/p single chamber Medtronic AICD placed in 2016   -AICD disabled today, 2/9/22 due to concern for receiving inappropriate shocks.  -Single-chamber AICD placed 2016 for secondary prevention.  -History of cardiac arrest 2016  -history of mild cardiomyopathy with EF 45% 2016. Plan:  I had a lengthy discussion with patient about the highly suspicious AICD lead error concerning for impending lead malfunction. I am going to disable the AICD today and set her up with a LifeVest as she does have a history of cardiac arrest and the device was placed for secondary prevention. I am also going to btain an echocardiogram, chest x-ray, place an event monitor for a week and I like to see her back next week to make a final plan on if she needs a new lead versus explant or if there is another potential explanation for the suspected lead failure. She was visibly a bit anxious after talking with her and I tried to provide reassurance. She works at [employer] and it is reasonable to continue work from my standpoint.     Past Medical History:   Diagnosis Date    Cardiac arrest with ventricular fibrillation ([facility] 05/2016    s/p ICD    Diabetes ([facility]     Hypertension     Nonischemic cardiomyopathy ([facility]        Current Outpatient Medications   Medication Sig Dispense Refill    amLODIPine (NORVASC) 10 mg tablet Take 1 Tablet by mouth daily. 90 Tablet 1    lisinopriL (PRINIVIL, ZESTRIL) 20 mg tablet Take 1 Tablet by mouth daily. 90 Tablet 2    carvediloL (COREG) 25 mg tablet Take 1 tablet by mouth twice daily 180 Tablet 0    ascorbic acid, vitamin C, (VITAMIN C) 500 mg tablet Take  by mouth.  aspirin delayed-release 81 mg tablet Take 2 Tabs by mouth daily. 180 Tab 3       Social History   reports that she has quit smoking. She quit smokeless tobacco use about 5 years ago. reports no history of alcohol use. Family History  family history includes Diabetes in her father; Heart Disease (age of onset: 77) in her mother; Stroke in her maternal grandmother. Review of Systems  Except as stated above include:  Constitutional: Negative for fever, chills and malaise/fatigue. HEENT: No congestion or recent URI. Gastrointestinal: No nausea, vomiting, abdominal pain, bloody stools. Pulmonary:  Negative except as stated above. Cardiac:  Negative except as stated above. Musculoskeletal: Negative except as stated above. Neurological:  No localized symptoms. Skin:  Negative except as stated above. Psych:  Negative except as stated above. Endocrine:  Negative except as stated above. PHYSICAL EXAM  BP Readings from Last 3 Encounters:   05/12/21 137/79   08/10/20 157/90   01/13/20 146/90     Pulse Readings from Last 3 Encounters:   05/12/21 (!) 56   08/10/20 (!) 57   01/13/20 65     Wt Readings from Last 3 Encounters:   05/12/21 100.7 kg (222 lb)   08/10/20 101.4 kg (223 lb 9.6 oz)   01/13/20 96.6 kg (213 lb)     General:   Well developed, well groomed.     Head/Neck:   No obvious jugular venous distention     No obvious carotid pulsations. No evidence of xanthelasma. Lungs:   No respiratory distress. Clear bilaterally. Heart:  Regular rate and rhythm. Normal S1/S2. Palpation grossly normal.    No significant murmurs, rubs or gallops. AICD pocket intact. Abdomen:   Non-acute abdomen. No obvious pulsations. Extremities:   Intact peripheral pulses. No significant edema. Neurological:   Alert and oriented to person, place, time. No focal neurological deficit visually. Skin:   No obvious rash    Blood Pressure Metric:  Monitor recommended and adjustments stated if needed.

## 2022-02-11 NOTE — PROGRESS NOTES
Verbal order and read back per Dr Rosa Alegria  Patient was fitted with life vest and event monitor today due to lead issue with AICD. Also CXR .

## 2022-02-16 ENCOUNTER — CLINICAL SUPPORT (OUTPATIENT)
Dept: CARDIOLOGY CLINIC | Age: 62
End: 2022-02-16
Payer: MEDICAID

## 2022-02-16 ENCOUNTER — OFFICE VISIT (OUTPATIENT)
Dept: CARDIOLOGY CLINIC | Age: 62
End: 2022-02-16

## 2022-02-16 VITALS
OXYGEN SATURATION: 96 % | HEIGHT: 67 IN | DIASTOLIC BLOOD PRESSURE: 66 MMHG | SYSTOLIC BLOOD PRESSURE: 110 MMHG | BODY MASS INDEX: 35.79 KG/M2 | HEART RATE: 83 BPM | WEIGHT: 228 LBS

## 2022-02-16 DIAGNOSIS — I10 ESSENTIAL HYPERTENSION WITH GOAL BLOOD PRESSURE LESS THAN 140/90: ICD-10-CM

## 2022-02-16 DIAGNOSIS — I42.8 CARDIOMYOPATHY, NONISCHEMIC (HCC): ICD-10-CM

## 2022-02-16 DIAGNOSIS — T82.110A AICD LEAD MALFUNCTION: Primary | ICD-10-CM

## 2022-02-16 DIAGNOSIS — I46.9 CARDIAC ARREST (HCC): ICD-10-CM

## 2022-02-16 DIAGNOSIS — Z01.818 PRE-OP TESTING: ICD-10-CM

## 2022-02-16 PROCEDURE — 99215 OFFICE O/P EST HI 40 MIN: CPT | Performed by: INTERNAL MEDICINE

## 2022-02-16 PROCEDURE — 93282 PRGRMG EVAL IMPLANTABLE DFB: CPT | Performed by: INTERNAL MEDICINE

## 2022-02-16 RX ORDER — SODIUM CHLORIDE 0.9 % (FLUSH) 0.9 %
5-40 SYRINGE (ML) INJECTION AS NEEDED
Status: CANCELLED | OUTPATIENT
Start: 2022-02-16

## 2022-02-16 RX ORDER — SODIUM CHLORIDE 0.9 % (FLUSH) 0.9 %
5-40 SYRINGE (ML) INJECTION EVERY 8 HOURS
Status: CANCELLED | OUTPATIENT
Start: 2022-02-16

## 2022-02-16 NOTE — PATIENT INSTRUCTIONS
[facility]          Patient  EP Instructions                  1. You are scheduled to have a Explant AICD and implant dual chamber AICD on  March 4th , at 08:00am.    Please check in at 06:30am.     2. Please go to [facility] and derek in the outpatient parking lot that is located around to the back of the hospital and enter through the [facility]. Once you enter through the [facility] check in with the  there. The  will either give you directions or assist you in getting to the cath holding area. 3.  You are not to eat or drink anything after midnight the night before your procedure. 4. Please continue to take your medications with a small sip of water on the morning of the procedure      5. If you are diabetic, do not take your insulin/sugar pill the morning of the procedure. 6. We encourage families to wait in the waiting room on the first floor while the procedure is being done. The Doctor will come out and talk with you as soon as the procedure is over. 7. There is the possibility that you may spend the night in the hospital, depending on the results of the procedure. This will be determined after the procedure is done. 8.   If you or your family have any questions, please call our office Monday-Friday 9:00am         -4:30 pm , at [phone], and ask to speak to one of the nurses.     9.   Please have Covid 19 testing done on February 28th at [facility] AT [facility] between the hours of 1:00 pm to 3:00 pm.       Lab work and chest x-ray to be done at time of COVID testing

## 2022-02-16 NOTE — PROGRESS NOTES
Stefany Martinez presents today for   Chief Complaint   Patient presents with    Follow-up     per Willa Ramey preferred language for health care discussion is english/other. Is someone accompanying this pt? no    Is the patient using any DME equipment during [address]? no    Depression Screening:  3 most recent PHQ Screens 2/16/2022   Little interest or pleasure in doing things Not at all   Feeling down, depressed, irritable, or hopeless Not at all   Total Score PHQ 2 0       Learning Assessment:  Learning Assessment 5/18/2016   PRIMARY LEARNER Patient   HIGHEST LEVEL OF EDUCATION - PRIMARY LEARNER  SOME COLLEGE   BARRIERS PRIMARY LEARNER NONE   PRIMARY LANGUAGE ENGLISH   LEARNER PREFERENCE PRIMARY LISTENING   ANSWERED BY patient   RELATIONSHIP SELF       Abuse Screening:  Abuse Screening Questionnaire 5/18/2016   Do you ever feel afraid of your partner? N   Are you in a relationship with someone who physically or mentally threatens you? N   Is it safe for you to go home? Y       Fall Risk  Fall Risk Assessment, last 12 mths 5/12/2021   Able to walk? Yes   Fall in past 12 months? 0   Do you feel unsteady? 0   Are you worried about falling 0       Pt currently taking Anticoagulant therapy? ASA 162mg every day     Coordination of Care:  1. Have you been to the ER, urgent care clinic since your last visit? Hospitalized since your last visit? no    2. Have you seen or consulted any other health care providers outside of the [address] [place] [address] since your last visit? Include any pap smears or colon screening.  no

## 2022-02-16 NOTE — PROGRESS NOTES
History of Present Illness:  63year old female here for follow up. She normally follows with Dr. Ольга Oneil and I saw her last week in the office as her AICD started alarming. It appeared that she had very short VV intervals consistent with impending RV lead fracture. Her impedence at one point jumped up to 1,500. I went ahead and disabled it and set her up with a LifeVest. She continues to work at [employer]. I reviewed her x-ray, which did not show obvious fracture. Her threshold impedance is stable today by device check. Overall she is doing well without any new chest pain, dyspnea, PND, orthopnea or edema. Impression:  1. 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  She is not device dependent. 2. Single chamber Medtronic AICD placed in 2016.  3. AICD disabled 02/09/22 with placement of LifeVest.  Device initially placed for secondary prevention. 4. History of cardiac arrest 2016.  5. History of mild cardiomyopathy with EF 45% 2016. Plan:  I had a lengthy discussion with the patient about the concern for the AICD lead malfunction. This spike in impedance is not related to any trauma and given the fact that this is for secondary prevention, I talked about putting a new lead and device in place next to the old or possible laser explant with a referral and implantation of new hardware. I discussed that if a new device is placed next to the old and lead is retained, this would no longer be MRI compliant, and there is always a risk for infection and bleeding. I talked about referring to [facility] to discuss lead explant. There is always a risk of complication associated with this as well. At this point, they have agreed to proceed to implant of new AICD with lead, as long as access is amenable. If it is not, my plan would be to refer to Dr. Yamel Hook for explant and additional options.   All questions answered. Past Medical History:   Diagnosis Date    Cardiac arrest with ventricular fibrillation ([facility] 05/2016    s/p ICD    [facility]     Hypertension     Nonischemic cardiomyopathy ([facility]        Current Outpatient Medications   Medication Sig Dispense Refill    amLODIPine (NORVASC) 10 mg tablet Take 1 Tablet by mouth daily. 90 Tablet 1    lisinopriL (PRINIVIL, ZESTRIL) 20 mg tablet Take 1 Tablet by mouth daily. 90 Tablet 2    carvediloL (COREG) 25 mg tablet Take 1 tablet by mouth twice daily 180 Tablet 0    ascorbic acid, vitamin C, (VITAMIN C) 500 mg tablet Take  by mouth.  aspirin delayed-release 81 mg tablet Take 2 Tabs by mouth daily. 180 Tab 3       Social History   reports that she has quit smoking. She quit smokeless tobacco use about 5 years ago. reports no history of alcohol use. Family History  family history includes Diabetes in her father; Heart Disease (age of onset: 77) in her mother; Stroke in her maternal grandmother. Review of Systems  Except as stated above include:  Constitutional: Negative for fever, chills and malaise/fatigue. HEENT: No congestion or recent URI. Gastrointestinal: No nausea, vomiting, abdominal pain, bloody stools. Pulmonary:  Negative except as stated above. Cardiac:  Negative except as stated above. Musculoskeletal: Negative except as stated above. Neurological:  No localized symptoms. Skin:  Negative except as stated above. Psych:  Negative except as stated above. Endocrine:  Negative except as stated above. PHYSICAL EXAM  BP Readings from Last 3 Encounters:   02/16/22 110/66   05/12/21 137/79   08/10/20 157/90     Pulse Readings from Last 3 Encounters:   02/16/22 83   05/12/21 (!) 56   08/10/20 (!) 57     Wt Readings from Last 3 Encounters:   02/16/22 103.4 kg (228 lb)   05/12/21 100.7 kg (222 lb)   08/10/20 101.4 kg (223 lb 9.6 oz)     General:   Well developed, well groomed.     Head/Neck:   No obvious jugular venous distention     No obvious carotid pulsations. No evidence of xanthelasma. Lungs:   No respiratory distress. Clear bilaterally. Heart:  Regular rate and rhythm. Normal S1/S2. Palpation grossly normal.    No significant murmurs, rubs or gallops. Abdomen:   Non-acute abdomen. No obvious pulsations. Extremities:   Intact peripheral pulses. No significant edema. Neurological:   Alert and oriented to person, place, time. No focal neurological deficit visually. Skin:   No obvious rash    Blood Pressure Metric:  Monitor recommended and adjustments stated if needed.

## 2022-02-25 ENCOUNTER — TELEPHONE (OUTPATIENT)
Dept: CARDIOLOGY CLINIC | Age: 62
End: 2022-02-25

## 2022-02-25 NOTE — TELEPHONE ENCOUNTER
Called pt to let them know that Dr. Marie Ren MD read the device cardiac rhythm summary  test results and they were \"sinus rhythm with PAC's, 2% and PVC's 6%, No VT\" pt content with call, no questions.

## 2022-02-27 NOTE — H&P
Plan new right ventricular AICD lead implantation for history of VT and new generator if access allows on the left. If unable to place, will plan to stop and discuss options. Pertinent risks, benefits, alternatives discussed. Plan moderate sedation if anesthesia not available. Risks include emergent intubation as well as possibly inability to intubate. Exacerbation of lung and heart disease including but not limited to heart failure and COPD/asthma. Risks included but not limited to acute and chronic pain, infection, bleeding, deep venous thrombosis with chronic swelling of extremity, pulmonary embolism, anesthesia reactions, pneumothorax, hemothorax, emergent open heart surgery, need for repeat surgery to replace/reposition leads, and death. There can be a prolonged hospitalization with brain damage and reduced or compromised long term mobility. By stating these are possible risks, this does not exclude the potential for additional risks not named here. Given the possible movement/manipulation of shoulder and arm after the procedure, there is a chance a 2nd or 3rd procedure could be necessary soon after the first, within hours to weeks to reposition or replace a lead or generator. All questions answered. 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 Device will not be MRI compatible after implant, discussed again. History of Present Illness:  63year old female here for follow up. She normally follows with Dr. Chinyere Day and I saw her last week in the office as her AICD started alarming. It appeared that she had very short VV intervals consistent with impending RV lead fracture. Her impedence at one point jumped up to 1,500.  I went ahead and disabled it and set her up with a LifeVest. She continues to work at [employer]. I reviewed her x-ray, which did not show obvious fracture. Her threshold impedance is stable today by device check. Overall she is doing well without any new chest pain, dyspnea, PND, orthopnea or edema.     Impression:  1. 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  She is not device dependent. 2. Single chamber Medtronic AICD placed in 2016.  3. AICD disabled 02/09/22 with placement of LifeVest.  Device initially placed for secondary prevention. 4. History of cardiac arrest 2016.  5. History of mild cardiomyopathy with EF 45% 2016.     Plan:  I had a lengthy discussion with the patient about the concern for the AICD lead malfunction. This spike in impedance is not related to any trauma and given the fact that this is for secondary prevention, I talked about putting a new lead and device in place next to the old or possible laser explant with a referral and implantation of new hardware. I discussed that if a new device is placed next to the old and lead is retained, this would no longer be MRI compliant, and there is always a risk for infection and bleeding. I talked about referring to [facility] to discuss lead explant. There is always a risk of complication associated with this as well. At this point, they have agreed to proceed to implant of new AICD with lead, as long as access is amenable. If it is not, my plan would be to refer to Dr. Lucy Jeffrey for explant and additional options.   All questions answered.             Past Medical History:   Diagnosis Date    Cardiac arrest with ventricular fibrillation ([facility] 05/2016     s/p ICD    Diabetes ([facility]      Hypertension      [facility]ischemic cardiomyopathy [facility])                  Current Outpatient Medications   Medication Sig Dispense Refill    amLODIPine (NORVASC) 10 mg tablet Take 1 Tablet by mouth daily. 90 Tablet 1    lisinopriL (PRINIVIL, ZESTRIL) 20 mg tablet Take 1 Tablet by mouth daily. 90 Tablet 2    carvediloL (COREG) 25 mg tablet Take 1 tablet by mouth twice daily 180 Tablet 0    ascorbic acid, vitamin C, (VITAMIN C) 500 mg tablet Take  by mouth.        aspirin delayed-release 81 mg tablet Take 2 Tabs by mouth daily. 180 Tab 3         Social History   reports that she has quit smoking. She quit smokeless tobacco use about 5 years ago. reports no history of alcohol use.     Family History  family history includes Diabetes in her father; Heart Disease (age of onset: 77) in her mother; Stroke in her maternal grandmother.     Review of Systems  Except as stated above include:  Constitutional: Negative for fever, chills and malaise/fatigue. HEENT: No congestion or recent URI. Gastrointestinal: No nausea, vomiting, abdominal pain, bloody stools. Pulmonary:  Negative except as stated above. Cardiac:  Negative except as stated above. Musculoskeletal: Negative except as stated above. Neurological:  No localized symptoms. Skin:  Negative except as stated above. Psych:  Negative except as stated above. Endocrine:  Negative except as stated above.     PHYSICAL EXAM      BP Readings from Last 3 Encounters:   02/16/22 110/66   05/12/21 137/79   08/10/20 157/90          Pulse Readings from Last 3 Encounters:   02/16/22 83   05/12/21 (!) 56   08/10/20 (!) 57          Wt Readings from Last 3 Encounters:   02/16/22 103.4 kg (228 lb)   05/12/21 100.7 kg (222 lb)   08/10/20 101.4 kg (223 lb 9.6 oz)      General:          Well developed, well groomed. Head/Neck:     No obvious jugular venous distention                           No obvious carotid pulsations. No evidence of xanthelasma. Lungs:             No respiratory distress. Clear bilaterally. Heart:              Regular rate and rhythm.   Normal S1/S2. Palpation grossly normal.                          No significant murmurs, rubs or gallops. Abdomen:        Non-acute abdomen. No obvious pulsations. Extremities:     Intact peripheral pulses. No significant edema. Neurological:   Alert and oriented to person, place, time. No focal neurological deficit visually.   Skin:                No obvious rash     Blood Pressure Metric:  Monitor recommended and adjustments stated if needed.         Electronically signed by Luis Rincon MD at 02/16/22 7246

## 2022-02-28 ENCOUNTER — HOSPITAL ENCOUNTER (OUTPATIENT)
Dept: LAB | Age: 62
Discharge: HOME | End: 2022-02-28

## 2022-02-28 LAB
SENTARA SPECIMEN COL,SENBCF: NORMAL
SENTARA SPECIMEN COL,SENBCF: NORMAL

## 2022-02-28 PROCEDURE — 99001 SPECIMEN HANDLING PT-LAB: CPT

## 2022-03-01 LAB
ABSOLUTE LYMPHOCYTE COUNT, 10803: 2.5 K/UL (ref 1–4.8)
ANION GAP SERPL CALC-SCNC: 14 MMOL/L (ref 3–15)
BASOPHILS # BLD: 0 K/UL (ref 0–0.2)
BASOPHILS NFR BLD: 0 % (ref 0–2)
BUN SERPL-MCNC: 10 MG/DL (ref 6–22)
CALCIUM SERPL-MCNC: 9.8 MG/DL (ref 8.4–10.5)
CHLORIDE SERPL-SCNC: 102 MMOL/L (ref 98–110)
CO2 SERPL-SCNC: 23 MMOL/L (ref 20–32)
CREAT SERPL-MCNC: 0.7 MG/DL (ref 0.5–1.2)
EOSINOPHIL # BLD: 0.2 K/UL (ref 0–0.5)
EOSINOPHIL NFR BLD: 3 % (ref 0–6)
ERYTHROCYTE [DISTWIDTH] IN BLOOD BY AUTOMATED COUNT: 15.9 % (ref 10–15.5)
GFRAA, 66117: >60
GFRNA, 66118: >60
GLUCOSE SERPL-MCNC: 136 MG/DL (ref 70–99)
GRANULOCYTES,GRANS: 56 % (ref 40–75)
HCT VFR BLD AUTO: 38.3 % (ref 35.1–48)
HGB BLD-MCNC: 11.9 G/DL (ref 11.7–16)
INR PPP: 0.96 (ref 0.89–1.29)
LYMPHOCYTES, LYMLT: 35 % (ref 20–45)
MCH RBC QN AUTO: 29 PG (ref 26–34)
MCHC RBC AUTO-ENTMCNC: 31 G/DL (ref 31–36)
MCV RBC AUTO: 94 FL (ref 80–99)
MONOCYTES # BLD: 0.4 K/UL (ref 0.1–1)
MONOCYTES NFR BLD: 6 % (ref 3–12)
NEUTROPHILS # BLD AUTO: 4.1 K/UL (ref 1.8–7.7)
PLATELET # BLD AUTO: 131 K/UL (ref 140–440)
PMV BLD AUTO: 11.7 FL (ref 9–13)
POTASSIUM SERPL-SCNC: 3.9 MMOL/L (ref 3.5–5.5)
PROTHROMBIN TIME: 10.4 SEC (ref 9–13)
RBC # BLD AUTO: 4.07 M/UL (ref 3.8–5.2)
SARS-COV-2, COV2NT: NOT DETECTED
SODIUM SERPL-SCNC: 139 MMOL/L (ref 133–145)
WBC # BLD AUTO: 7.2 K/UL (ref 4–11)

## 2022-03-04 ENCOUNTER — ANESTHESIA (OUTPATIENT)
Dept: CARDIAC CATH/INVASIVE PROCEDURES | Age: 62
End: 2022-03-04
Payer: MEDICAID

## 2022-03-04 ENCOUNTER — HOSPITAL ENCOUNTER (OUTPATIENT)
Age: 62
Setting detail: OUTPATIENT SURGERY
Discharge: HOME | End: 2022-03-04
Attending: INTERNAL MEDICINE | Admitting: INTERNAL MEDICINE
Payer: MEDICAID

## 2022-03-04 ENCOUNTER — APPOINTMENT (OUTPATIENT)
Dept: GENERAL RADIOLOGY | Age: 62
End: 2022-03-04
Attending: INTERNAL MEDICINE
Payer: MEDICAID

## 2022-03-04 ENCOUNTER — ANESTHESIA EVENT (OUTPATIENT)
Dept: CARDIAC CATH/INVASIVE PROCEDURES | Age: 62
End: 2022-03-04
Payer: MEDICAID

## 2022-03-04 VITALS
OXYGEN SATURATION: 98 % | DIASTOLIC BLOOD PRESSURE: 83 MMHG | WEIGHT: 215 LBS | HEART RATE: 54 BPM | RESPIRATION RATE: 16 BRPM | SYSTOLIC BLOOD PRESSURE: 146 MMHG | BODY MASS INDEX: 33.74 KG/M2 | HEIGHT: 67 IN

## 2022-03-04 DIAGNOSIS — Z95.810 AICD (AUTOMATIC CARDIOVERTER/DEFIBRILLATOR) PRESENT: Primary | ICD-10-CM

## 2022-03-04 DIAGNOSIS — T82.118A AICD MALFUNCTION: ICD-10-CM

## 2022-03-04 PROCEDURE — C1769 GUIDE WIRE: HCPCS | Performed by: INTERNAL MEDICINE

## 2022-03-04 PROCEDURE — 74011000250 HC RX REV CODE- 250: Performed by: INTERNAL MEDICINE

## 2022-03-04 PROCEDURE — 77030028700 HC BLD TISS PLSM MEDT -E: Performed by: INTERNAL MEDICINE

## 2022-03-04 PROCEDURE — C1893 INTRO/SHEATH, FIXED,NON-PEEL: HCPCS | Performed by: INTERNAL MEDICINE

## 2022-03-04 PROCEDURE — 76060000033 HC ANESTHESIA 1 TO 1.5 HR: Performed by: INTERNAL MEDICINE

## 2022-03-04 PROCEDURE — C1895 LEAD, AICD, ENDO DUAL COIL: HCPCS | Performed by: INTERNAL MEDICINE

## 2022-03-04 PROCEDURE — C1722 AICD, SINGLE CHAMBER: HCPCS | Performed by: INTERNAL MEDICINE

## 2022-03-04 PROCEDURE — 74011250636 HC RX REV CODE- 250/636: Performed by: ANESTHESIOLOGY

## 2022-03-04 PROCEDURE — 00534 ANES INSERTION/RPLCMT CVDFB: CPT | Performed by: ANESTHESIOLOGY

## 2022-03-04 PROCEDURE — 77030002996 HC SUT SLK J&J -A: Performed by: INTERNAL MEDICINE

## 2022-03-04 PROCEDURE — 74011000636 HC RX REV CODE- 636: Performed by: INTERNAL MEDICINE

## 2022-03-04 PROCEDURE — 77030040375: Performed by: INTERNAL MEDICINE

## 2022-03-04 PROCEDURE — 77030002933 HC SUT MCRYL J&J -A: Performed by: INTERNAL MEDICINE

## 2022-03-04 PROCEDURE — 33249 INSJ/RPLCMT DEFIB W/LEAD(S): CPT | Performed by: INTERNAL MEDICINE

## 2022-03-04 PROCEDURE — 77030031139 HC SUT VCRL2 J&J -A: Performed by: INTERNAL MEDICINE

## 2022-03-04 PROCEDURE — 71045 X-RAY EXAM CHEST 1 VIEW: CPT

## 2022-03-04 PROCEDURE — 77030018729 HC ELECTRD DEFIB PAD CARD -B: Performed by: INTERNAL MEDICINE

## 2022-03-04 PROCEDURE — 77030019580 HC CBL PACE MEDT -B: Performed by: INTERNAL MEDICINE

## 2022-03-04 DEVICE — LEAD 6947M62 QUATTRO SECURE MRI US
Type: IMPLANTABLE DEVICE | Status: FUNCTIONAL
Brand: SPRINT QUATTRO SECURE MRI™ SURESCAN™

## 2022-03-04 DEVICE — ICD-VR DVFB1D4 VISIA AF MRI US DF4
Type: IMPLANTABLE DEVICE | Status: FUNCTIONAL
Brand: VISIA AF MRI™ VR SURESCAN™

## 2022-03-04 RX ORDER — SODIUM CHLORIDE 0.9 % (FLUSH) 0.9 %
5-40 SYRINGE (ML) INJECTION AS NEEDED
Status: DISCONTINUED | OUTPATIENT
Start: 2022-03-04 | End: 2022-03-04 | Stop reason: HOSPADM

## 2022-03-04 RX ORDER — FENTANYL CITRATE 50 UG/ML
INJECTION, SOLUTION INTRAMUSCULAR; INTRAVENOUS AS NEEDED
Status: DISCONTINUED | OUTPATIENT
Start: 2022-03-04 | End: 2022-03-04 | Stop reason: HOSPADM

## 2022-03-04 RX ORDER — MAGNESIUM SULFATE 100 %
4 CRYSTALS MISCELLANEOUS AS NEEDED
Status: DISCONTINUED | OUTPATIENT
Start: 2022-03-04 | End: 2022-03-04 | Stop reason: HOSPADM

## 2022-03-04 RX ORDER — ONDANSETRON 2 MG/ML
4 INJECTION INTRAMUSCULAR; INTRAVENOUS ONCE
Status: DISCONTINUED | OUTPATIENT
Start: 2022-03-04 | End: 2022-03-04 | Stop reason: HOSPADM

## 2022-03-04 RX ORDER — FENTANYL CITRATE 50 UG/ML
50 INJECTION, SOLUTION INTRAMUSCULAR; INTRAVENOUS AS NEEDED
Status: DISCONTINUED | OUTPATIENT
Start: 2022-03-04 | End: 2022-03-04 | Stop reason: HOSPADM

## 2022-03-04 RX ORDER — PROPOFOL 10 MG/ML
VIAL (ML) INTRAVENOUS
Status: DISCONTINUED | OUTPATIENT
Start: 2022-03-04 | End: 2022-03-04 | Stop reason: HOSPADM

## 2022-03-04 RX ORDER — LIDOCAINE HYDROCHLORIDE 10 MG/ML
INJECTION, SOLUTION EPIDURAL; INFILTRATION; INTRACAUDAL; PERINEURAL AS NEEDED
Status: DISCONTINUED | OUTPATIENT
Start: 2022-03-04 | End: 2022-03-04 | Stop reason: HOSPADM

## 2022-03-04 RX ORDER — OXYCODONE AND ACETAMINOPHEN 5; 325 MG/1; MG/1
1 TABLET ORAL
Status: DISCONTINUED | OUTPATIENT
Start: 2022-03-04 | End: 2022-03-04 | Stop reason: HOSPADM

## 2022-03-04 RX ORDER — OXYCODONE AND ACETAMINOPHEN 5; 325 MG/1; MG/1
1 TABLET ORAL
Qty: 12 TABLET | Refills: 0 | Status: SHIPPED | OUTPATIENT
Start: 2022-03-04 | End: 2022-03-07

## 2022-03-04 RX ORDER — SODIUM CHLORIDE 0.9 % (FLUSH) 0.9 %
5-40 SYRINGE (ML) INJECTION EVERY 8 HOURS
Status: DISCONTINUED | OUTPATIENT
Start: 2022-03-04 | End: 2022-03-04 | Stop reason: HOSPADM

## 2022-03-04 RX ORDER — CEFAZOLIN SODIUM 1 G/3ML
INJECTION, POWDER, FOR SOLUTION INTRAMUSCULAR; INTRAVENOUS AS NEEDED
Status: DISCONTINUED | OUTPATIENT
Start: 2022-03-04 | End: 2022-03-04 | Stop reason: HOSPADM

## 2022-03-04 RX ORDER — SODIUM CHLORIDE 9 MG/ML
INJECTION, SOLUTION INTRAVENOUS
Status: DISCONTINUED | OUTPATIENT
Start: 2022-03-04 | End: 2022-03-04 | Stop reason: HOSPADM

## 2022-03-04 RX ORDER — MIDAZOLAM HYDROCHLORIDE 1 MG/ML
INJECTION, SOLUTION INTRAMUSCULAR; INTRAVENOUS AS NEEDED
Status: DISCONTINUED | OUTPATIENT
Start: 2022-03-04 | End: 2022-03-04 | Stop reason: HOSPADM

## 2022-03-04 RX ADMIN — FENTANYL CITRATE 50 MCG: 50 INJECTION, SOLUTION INTRAMUSCULAR; INTRAVENOUS at 09:26

## 2022-03-04 RX ADMIN — MIDAZOLAM HYDROCHLORIDE 2 MG: 2 INJECTION, SOLUTION INTRAMUSCULAR; INTRAVENOUS at 08:38

## 2022-03-04 RX ADMIN — CEFAZOLIN SODIUM 2 G: 1 INJECTION, POWDER, FOR SOLUTION INTRAMUSCULAR; INTRAVENOUS at 08:37

## 2022-03-04 RX ADMIN — PROPOFOL 75 MCG/KG/MIN: 10 INJECTION, EMULSION INTRAVENOUS at 08:34

## 2022-03-04 RX ADMIN — FENTANYL CITRATE 50 MCG: 50 INJECTION, SOLUTION INTRAMUSCULAR; INTRAVENOUS at 08:43

## 2022-03-04 RX ADMIN — SODIUM CHLORIDE: 9 INJECTION, SOLUTION INTRAVENOUS at 08:24

## 2022-03-04 NOTE — DISCHARGE INSTRUCTIONS
DISCHARGE SUMMARY from Nurse    PATIENT INSTRUCTIONS:    After general anesthesia or intravenous sedation, for 24 hours or while taking prescription Narcotics:  · Limit your activities  · Do not drive and operate hazardous machinery  · Do not make important personal or business decisions  · Do  not drink alcoholic beverages  · If you have not urinated within 8 hours after discharge, please contact your surgeon on call. Report the following to your surgeon:  · Excessive pain, swelling, redness or odor of or around the surgical area  · Temperature over 100.5  · Nausea and vomiting lasting longer than 4 hours or if unable to take medications  · Any signs of decreased circulation or nerve impairment to extremity: change in color, persistent  numbness, tingling, coldness or increase pain  · Any questions    What to do at Home:  Recommended activity: {discharge activity:93541}, ***    If you experience any of the following symptoms ***, please follow up with ***. *  Please give a list of your current medications to your Primary Care Provider. *  Please update this list whenever your medications are discontinued, doses are      changed, or new medications (including over-the-counter products) are added. *  Please carry medication information at all times in case of emergency situations. These are general instructions for a healthy lifestyle:    No smoking/ No tobacco products/ Avoid exposure to second hand smoke  Surgeon General's Warning:  Quitting smoking now greatly reduces serious risk to your health.     Obesity, smoking, and sedentary lifestyle greatly increases your risk for illness    A healthy diet, regular physical exercise & weight monitoring are important for maintaining a healthy lifestyle    You may be retaining fluid if you have a history of heart failure or if you experience any of the following symptoms:  Weight gain of 3 pounds or more overnight or 5 pounds in a week, increased swelling in our hands or feet or shortness of breath while lying flat in bed. Please call your doctor as soon as you notice any of these symptoms; do not wait until your next office visit. The discharge information has been reviewed with the {PATIENT PARENT GUARDIAN:33730}. The {PATIENT PARENT GUARDIAN:08359} verbalized understanding. Discharge medications reviewed with the {Dishcarge meds reviewed NDGA:68841} and appropriate educational materials and side effects teaching were provided. ___________________________________________________________________________________________________________________________________  Disposition:  Will need follow-up with device/wound check in 7 days in my office. Please contact office at [phone] to confirm appointment. Main Office:    [address] [place] [address]    Restrictions: For affected arm:  No lifting greater than 10 lbs or lifting elbow above shoulder for 4 weeks. Keep incision clean and dry for a total of 72 hours after procedure. Remove dressing in 7 days if not already removed. No hot tubs or pools for 2 weeks. OK to shower with \"pat\" dry incision after 72 hours. No driving ideally for 1 week due to concern for airbag, minimize for 1 additional week. Patient Education        ICD (Implantable Cardioverter-Defibrillator) Placement: What to Expect at Home  Your Recovery     Implantable cardioverter-defibrillator (ICD) placement is surgery to put an ICD in your chest. An ICD is a small, battery-powered device that fixes life-threatening changes in your heartbeat. Your doctor made a cut (incision) in your upper chest. The doctor placed the ICD and one or two leads (wires) under the skin of your chest. The leads were put into the heart through a blood vessel. Your chest may be sore where the doctor made the cut (incision) and put in the ICD. You also may have a bruise and mild swelling.  These symptoms usually get better in 1 to 2 weeks. You may feel a hard ridge along the incision. This usually gets softer in the months after surgery. You probably will be able to see and feel the outline of the ICD under your skin. You will probably be able to go back to work or your usual routine 1 to 2 weeks after surgery. Your doctor will talk to you about how often you will need to have the ICD checked. You'll need to take steps to safely use electric devices. Some of these devices can stop your ICD from working right for a short time. Check with your doctor about what to avoid and what to keep a short distance away from your ICD. For example, you will need to stay away from things with strong magnetic and electrical fields. An example is an MRI machine (unless your ICD is safe for an MRI). You can use a cellphone and other wireless devices, but keep them at least 6 inches away from your ICD. Many household and office electronics don't affect an ICD. These include kitchen appliances and computers. This care sheet gives you a general idea about how long it will take for you to recover. But each person recovers at a different pace. Follow the steps below to get better as quickly as possible. How can you care for yourself at home? Activity    · Rest when you feel tired. Getting enough sleep will help you recover.     · Try to walk each day. Start by walking a little more than you did the day before. Bit by bit, increase the amount you walk. Walking boosts blood flow and helps prevent pneumonia and constipation.     · For 4 to 6 weeks:  ? Avoid activities that strain your chest or upper arm muscles. This includes pushing a  or vacuum, mopping floors, swimming, or swinging a golf club or tennis racquet. ? Do not raise your arm, on the side of your body where the ICD is located, above shoulder level. ? Avoid strenuous activities, such as bicycle riding, jogging, weight lifting, or heavy aerobic exercise. ?  Avoid lifting anything that would make you strain. This may include heavy grocery bags and milk containers, a heavy briefcase or backpack, cat litter or dog food bags, or a child.     · Ask your doctor when you can drive again.     · You will probably need to take about 1 to 2 weeks off from work. It depends on the type of work you do and how you feel.     · Ask your doctor when it is okay for you to have sex. Diet    · You can eat your normal diet. If your stomach is upset, try bland, low-fat foods like plain rice, broiled chicken, toast, and yogurt.     · Drink plenty of fluids (unless your doctor tells you not to). Medicines    · Your doctor will tell you if and when you can restart your medicines. You will also get instructions about taking any new medicines.     · If you take aspirin or some other blood thinner, ask your doctor if and when to start taking it again. Make sure that you understand exactly what your doctor wants you to do.     · Take pain medicines exactly as directed. ? If the doctor gave you a prescription medicine for pain, take it as prescribed. ? If you are not taking a prescription pain medicine, ask your doctor if you can take an over-the-counter medicine. ? Do not take aspirin, ibuprofen (Advil, Motrin), naproxen (Aleve), or other nonsteroidal anti-inflammatory drugs (NSAIDs) unless your doctor says it is okay.     · If you think your pain medicine is making you sick to your stomach:  ? Take your medicine after meals (unless your doctor has told you not to). ? Ask your doctor for a different pain medicine.     · If your doctor prescribed antibiotics, take them as directed. Do not stop taking them just because you feel better. You need to take the full course of antibiotics.    Incision care    · Keep the area clean and dry.     · Ask your doctor when you can shower or take a bath.     · If you have strips of tape on the incision, leave the tape on for a week or until it falls off.     · Wash the area daily with warm, soapy water, and pat it dry. Don't use hydrogen peroxide or alcohol, which can slow healing. You may cover the area with a gauze bandage if it weeps or rubs against clothing. Exercise    · Check with your doctor before you start an exercise program. Your doctor may recommend that you work with a physical therapist to learn how to exercise safely with an ICD. Other instructions    · Carry your ICD identification card with you at all times. The card should include the ICD  and model information.     · Wear medical alert jewelry that states you have an ICD. You can buy this at most [employer].     · Have your ICD checked as often as your doctor recommends. In some cases, this may be done over the phone or online. Your doctor will give you instructions about how to do this.     · Talk with your doctor about the possibility of turning off the ICD at the end of life. You can put your wishes about the ICD in an advance directive. Follow-up care is a key part of your treatment and safety. Be sure to make and go to all appointments, and call your doctor if you are having problems. It's also a good idea to know your test results and keep a list of the medicines you take. When should you call for help? Call 911 anytime you think you may need emergency care. For example, call if:    · You passed out (lost consciousness).     · You have trouble breathing. Call your doctor now or seek immediate medical care if:    · You receive a shock from your ICD.     · You are dizzy or lightheaded, or you feel like you may faint.     · You have pain that does not get better after you take pain medicine.     · You hear an alarm or feel a vibration from your ICD.     · You have loose stitches, or your incision comes open.     · Bright red blood has soaked through the bandage over your incision.     · You have signs of infection, such as:  ? Increased pain, swelling, warmth, or redness.   ? Red streaks leading from the incision. ? Pus draining from the incision. ? A fever. Watch closely for changes in your health, and be sure to contact your doctor if you have any problems. Where can you learn more? Go to http://www.gray.com/  Enter K184 in the search box to learn more about \"ICD (Implantable Cardioverter-Defibrillator) Placement: What to Expect at Home. \"  Current as of: April 29, 2021               Content Version: 13.0  © 2006-2021 [email or web address]. Care instructions adapted under license by [employer] (which disclaims liability or warranty for this information). If you have questions about a medical condition or this instruction, always ask your healthcare professional. [place] [address] any warranty or liability for your use of this information. Patient Education        Learning About ICD Shocks  What are ICDs and ICD shocks? An implantable cardioverter-defibrillator (ICD) is a device that is placed under the skin of your chest. It has thin wires (called leads). Most of the time, these leads are placed inside the heart. Some ICDs have a lead that is placed under the skin so that it lies near your heart. The ICD is always checking your heart. If it detects a life-threatening rapid heart rhythm, it tries to slow the rhythm to get it back to normal. If the dangerous rhythm does not stop, the ICD sends an electric shock to the heart to restore a normal rhythm. The device then goes back to its watchful mode. The idea of living with an ICD and getting shocked worries some people. The shock can be uncomfortable. It may feel like you are being kicked in the chest. For many people, getting a shock can cause anxiety and depression. It's normal to be worried about living with an ICD. After all, you don't know when a shock might occur, and a shock could be a reminder that your heart is not as healthy as it could be.   But an ICD is an important part of your treatment. It can save your life. If you take a few simple steps, you can feel better about having an ICD. How can you get over your fears about the ICD? Know your ICD treatment   · Learn how the ICD works, what it does, and how it keeps you safe. This can help reduce any anxiety you may feel. · Keep your regular doctor appointments. Your doctor:  ? Sets both the rate at which a shock will occur and the level of shock needed to restore your heart to a normal rate. ? Checks to see whether the ICD has given you any shocks since your last visit. This helps your doctor know if your medicines need to be adjusted. ? Checks the ICD battery and replaces it as needed. · Talk with others who have an ICD. Ask them if they have been shocked and what it was like. Ask them how they cope with it. Talking with others can help you feel better. · Always carry your ICD identity card, a list of all the medicines you are taking, and your doctor's name and phone number. This will help you get the best possible treatment if you get a shock and need help. Make an action plan   Talk to your doctor about making an action plan for what to do if you get shocked. Here is an example:  · After one shock:  ? Call 911 or other emergency services right away if you feel bad or have symptoms like chest pain. ? Call your doctor soon if you feel fine right away after the shock. Your doctor may want to talk about the shock and schedule a follow-up visit. · If you get a second shock in a 24-hour period, call your doctor right away. Call even if you feel fine right away. Stay calm after a shock   · Follow the action plan you made with your doctor. · Do some breathing exercises. They may help you relax. ? Sit or lie in a comfortable position. Put one hand on your belly just below your ribs and the other hand on your chest.  ? Take a deep breath in through your nose, and let your belly push your hand out. Your chest should not move. ?  Breathe out through pursed lips as if you were whistling. Feel the hand on your belly go in, and use it to push all the air out. ? Breathe in and out like this until you feel more relaxed. · Keep a good attitude. When you've had a shock, you may question how healthy you are or worry about getting another shock. But try to focus on the positive things in your life, like loving relationships, pleasant activities, or good friends. · Don't make changes in what you do. You may want to avoid an action because you think it caused the shock. But a shock can occur at any time, and you can't prevent shocks by your actions alone. Don't stop doing things you enjoy to try to avoid a shock. Follow-up care is a key part of your treatment and safety. Be sure to make and go to all appointments, and call your doctor if you are having problems. It's also a good idea to know your test results and keep a list of the medicines you take. Where can you learn more? Go to http://www.gray.com/  Enter B760 in the search box to learn more about \"Learning About ICD Shocks. \"  Current as of: April 29, 2021               Content Version: 13.0  © 2006-2021 [employer]. Care instructions adapted under license by [employer] (which disclaims liability or warranty for this information). If you have questions about a medical condition or this instruction, always ask your healthcare professional. [place] [address] any warranty or liability for your use of this information.          DISCHARGE SUMMARY from Nurse    PATIENT INSTRUCTIONS:    After general anesthesia or intravenous sedation, for 24 hours or while taking prescription Narcotics:  · Limit your activities  · Do not drive and operate hazardous machinery  · Do not make important personal or business decisions  · Do  not drink alcoholic beverages  · If you have not urinated within 8 hours after discharge, please contact your surgeon on call.    Report the following to your surgeon:  · Excessive pain, swelling, redness or odor of or around the surgical area  · Temperature over 100.5  · Nausea and vomiting lasting longer than 4 hours or if unable to take medications  · Any signs of decreased circulation or nerve impairment to extremity: change in color, persistent  numbness, tingling, coldness or increase pain  · Any questions    What to do at Home:  Recommended activity: Activity as tolerated and no driving for today. If you experience any symptoms of infection, please follow up with Dr. Tesfaye Betancur office or your primary care. *  Please give a list of your current medications to your Primary Care Provider. *  Please update this list whenever your medications are discontinued, doses are      changed, or new medications (including over-the-counter products) are added. *  Please carry medication information at all times in case of emergency situations. These are general instructions for a healthy lifestyle:    No smoking/ No tobacco products/ Avoid exposure to second hand smoke  Surgeon General's Warning:  Quitting smoking now greatly reduces serious risk to your health. Obesity, smoking, and sedentary lifestyle greatly increases your risk for illness    A healthy diet, regular physical exercise & weight monitoring are important for maintaining a healthy lifestyle    You may be retaining fluid if you have a history of heart failure or if you experience any of the following symptoms:  Weight gain of 3 pounds or more overnight or 5 pounds in a week, increased swelling in our hands or feet or shortness of breath while lying flat in bed. Please call your doctor as soon as you notice any of these symptoms; do not wait until your next office visit. The discharge information has been reviewed with the patient. The patient verbalized understanding.   Discharge medications reviewed with the patient and appropriate educational materials and side effects teaching were provided.   ___________________________________________________________________________________________________________________________________

## 2022-03-04 NOTE — ANESTHESIA POSTPROCEDURE EVALUATION
Procedure(s):  INSERT ICD SINGLE. MAC    Anesthesia Post Evaluation      Multimodal analgesia: multimodal analgesia used between 6 hours prior to anesthesia start to PACU discharge  Patient location during evaluation: PACU  Patient participation: complete - patient participated  Level of consciousness: awake and alert  Pain management: adequate  Airway patency: patent  Anesthetic complications: no  Cardiovascular status: acceptable  Respiratory status: acceptable  Hydration status: acceptable  Post anesthesia nausea and vomiting:  none  Final Post Anesthesia Temperature Assessment:  Normothermia (36.0-37.5 degrees C)      INITIAL Post-op Vital signs:   Vitals Value Taken Time   /78 03/04/22 1023   Temp     Pulse 48 03/04/22 1031   Resp 16 03/04/22 0944   SpO2 97 % 03/04/22 1031   Vitals shown include unvalidated device data.

## 2022-03-04 NOTE — ANESTHESIA PREPROCEDURE EVALUATION
Relevant Problems   CARDIOVASCULAR   (+) Cardiac arrest (HCC)   (+) Essential hypertension with goal blood pressure less than 140/90      GASTROINTESTINAL   (+) Hepatitis B carrier (HCC)      HEMATOLOGY   (+) Anemia       Anesthetic History   No history of anesthetic complications            Review of Systems / Medical History  Patient summary reviewed and pertinent labs reviewed    Pulmonary  Within defined limits                 Neuro/Psych   Within defined limits           Cardiovascular    Hypertension        Dysrhythmias   Pacemaker and past MI    Exercise tolerance: <4 METS  Comments: Hx of cardiac arrest, MI,    GI/Hepatic/Renal                Endo/Other    Diabetes: well controlled    Obesity     Other Findings              Physical Exam    Airway  Mallampati: II  TM Distance: 4 - 6 cm  Neck ROM: normal range of motion   Mouth opening: Normal     Cardiovascular    Rhythm: regular  Rate: normal         Dental    Dentition: Upper partial plate and Poor dentition     Pulmonary  Breath sounds clear to auscultation               Abdominal  GI exam deferred       Other Findings            Anesthetic Plan    ASA: 3  Anesthesia type: MAC          Induction: Intravenous  Anesthetic plan and risks discussed with: Patient

## 2022-03-04 NOTE — Clinical Note
TRANSFER - IN REPORT:     [facility]. Report consisted of patient's Situation, Background, Assessment and   Recommendations(SBAR). Opportunity for questions and clarification was provided. Assessment completed upon patient's arrival to unit and care assumed. Patient transported with a Cardiac Cath Tech / Patient Care Tech.

## 2022-03-04 NOTE — ROUTINE PROCESS
0720: Cath holding summary     Patient escorted to cath holding from waiting area ambulatory, alert and oriented x 4, voicing no complaints. Changed into gown and placed on monitor. NPO since MN. Lab results, med rec and H&P reviewed on chart. PIV x 1 inserted without difficulty. 1200: AVS Discharge instructions reviewed with patient and copy given to patient. All questions answered. Patient verbalized understanding to all discharge instructions. PIV removed. Procedural site within normal limits. No hematoma or bleeding noted from procedural and PIV site. No pain noted at discharge. Patient discharged with support person in stable condition. Escorted out to vehicle for transport home.

## 2022-03-10 ENCOUNTER — CLINICAL SUPPORT (OUTPATIENT)
Dept: CARDIOLOGY CLINIC | Age: 62
End: 2022-03-10
Payer: MEDICAID

## 2022-03-10 DIAGNOSIS — Z95.810 AICD (AUTOMATIC CARDIOVERTER/DEFIBRILLATOR) PRESENT: Primary | ICD-10-CM

## 2022-03-10 DIAGNOSIS — I42.8 CARDIOMYOPATHY, NONISCHEMIC (HCC): ICD-10-CM

## 2022-03-10 PROCEDURE — 93282 PRGRMG EVAL IMPLANTABLE DFB: CPT | Performed by: INTERNAL MEDICINE

## 2022-03-10 NOTE — PROGRESS NOTES
I have personally seen and evaluated the device findings. Interrogation reviewed and I agree with assessment.     Kitty Whitman

## 2022-03-10 NOTE — PROGRESS NOTES
Patient presented in office for device and wound check. Dressing removed. No redness, swelling, drainage or irritation noted. Site clean and dry. No questions or concerns at present. Patient follows up with Dr. Ольга Oneil in [place] office.

## 2022-03-11 NOTE — PROGRESS NOTES
I have personally seen and evaluated the device findings. Interrogation reviewed and I agree with assessment.     Tejas Hancock

## 2022-03-14 NOTE — PROGRESS NOTES
I have personally seen and evaluated the device findings. Interrogation reviewed and I agree with assessment.     Riley Breaux

## 2022-03-16 RX ORDER — CARVEDILOL 25 MG/1
TABLET ORAL
Qty: 180 TABLET | Refills: 0 | Status: SHIPPED | OUTPATIENT
Start: 2022-03-16 | End: 2022-09-20

## 2022-04-07 NOTE — PROGRESS NOTES
I have personally seen and evaluated the device findings. Interrogation reviewed and I agree with assessment.     James Moreira

## 2022-05-13 ENCOUNTER — OFFICE VISIT (OUTPATIENT)
Dept: CARDIOLOGY CLINIC | Age: 62
End: 2022-05-13
Payer: MEDICAID

## 2022-05-13 VITALS
OXYGEN SATURATION: 96 % | HEART RATE: 57 BPM | DIASTOLIC BLOOD PRESSURE: 70 MMHG | WEIGHT: 225.2 LBS | BODY MASS INDEX: 35.27 KG/M2 | TEMPERATURE: 97 F | SYSTOLIC BLOOD PRESSURE: 124 MMHG

## 2022-05-13 DIAGNOSIS — I42.8 CARDIOMYOPATHY, NONISCHEMIC (HCC): Primary | ICD-10-CM

## 2022-05-13 PROCEDURE — 99214 OFFICE O/P EST MOD 30 MIN: CPT | Performed by: INTERNAL MEDICINE

## 2022-05-13 PROCEDURE — 93000 ELECTROCARDIOGRAM COMPLETE: CPT | Performed by: INTERNAL MEDICINE

## 2022-05-13 NOTE — PROGRESS NOTES
Identified pt with two pt identifiers(name and ). Reviewed record in preparation for visit and have obtained necessary documentation. Jim Henao presents today for   Chief Complaint   Patient presents with    Follow-up       Pt denies DIZZINESS, SOB, CHEST PAIN/ PRESSURE, FATIGUE/WEAKNESS, HEADACHES, SWELLING. Jim Henao preferred language for health care discussion is english/other. Personal Protective Equipment:   Personal Protective Equipment was used including: mask-surgical and hands-gloves. Patient was placed on no precaution(s). Patient was masked. Precautions:   Patient currently on None  Patient currently roomed with door closed. Is someone accompanying this pt? No     Is the patient using any DME equipment during OV? No     Depression Screening:  3 most recent PHQ Screens 2022   Little interest or pleasure in doing things Not at all   Feeling down, depressed, irritable, or hopeless Not at all   Total Score PHQ 2 0       Learning Assessment:  Learning Assessment 2016   PRIMARY LEARNER Patient   HIGHEST LEVEL OF EDUCATION - PRIMARY LEARNER  SOME COLLEGE   BARRIERS PRIMARY LEARNER NONE   PRIMARY LANGUAGE ENGLISH   LEARNER PREFERENCE PRIMARY LISTENING   ANSWERED BY patient   RELATIONSHIP SELF       Abuse Screening:  Abuse Screening Questionnaire 2016   Do you ever feel afraid of your partner? N   Are you in a relationship with someone who physically or mentally threatens you? N   Is it safe for you to go home? Y       Fall Risk  Fall Risk Assessment, last 12 mths 2021   Able to walk? Yes   Fall in past 12 months? 0   Do you feel unsteady? 0   Are you worried about falling 0       Pt currently taking Anticoagulant therapy? No   Pt currently taking Antiplatelet therapy? yes    Coordination of Care:  1. Have you been to the ER, urgent care clinic since your last visit? Hospitalized since your last visit? No     2.  Have you seen or consulted any other health care providers outside of the [address] [place] [address] [place] since your last visit? Include any pap smears or colon screening. No       Please see Red banners under Allergies and Med Rec to remove outside inquires. All correct information has been verified with patient and added to chart.      Medication's patient's would liked removed has been marked not taking to be removed per Verbal order and read back per Nichol Osorio MD

## 2022-05-22 NOTE — PROGRESS NOTES
Subjective:      Brittnee Ash is in the office today for cardiac reevaluation. She is a 68-year-old woman who had a cardiac arrest in the setting of hypokalemia on 05/02/2016. During her hospitalization, she was found to have an ejection fraction of only 25%. Prior to her discharge, her ejection fraction improved to 45-50%. She did have a previous cardiac catheterization done in 2014, which showed noncritical coronary artery disease. She had a complicated in-hospital course with acute kidney injury, altered mental status/encephalopathy, and aspiration pneumonia. She ultimately had an AICD placed by Dr. Artis Solomon. In the office today, she reports that she is doing well. She has had no chest pain or shortness of breath. She recently had an issue with her AICD lead. She had a new lead placed on 3/4/2022. She has been following with Dr. Artis Solomon. Patient Active Problem List    Diagnosis Date Noted    LVH (left ventricular hypertrophy) 07/21/2016    Cardiac arrest ([facility] 05/31/2016    AICD (automatic cardioverter/defibrillator) present 05/31/2016    Essential hypertension with goal blood pressure less than 140/90 05/18/2016    Prediabetes 05/18/2016    Mixed hyperlipidemia 05/18/2016    Hepatitis B carrier ([facility] 05/18/2016    Anemia 05/18/2016    Cardiomyopathy, nonischemic (HCC) 05/04/2016     Current Outpatient Medications   Medication Sig Dispense Refill    carvediloL (COREG) 25 mg tablet Take 1 tablet by mouth twice daily 180 Tablet 0    amLODIPine (NORVASC) 10 mg tablet Take 1 Tablet by mouth daily. 90 Tablet 1    lisinopriL (PRINIVIL, ZESTRIL) 20 mg tablet Take 1 Tablet by mouth daily. 90 Tablet 2    ascorbic acid, vitamin C, (VITAMIN C) 500 mg tablet Take  by mouth.  aspirin delayed-release 81 mg tablet Take 2 Tabs by mouth daily.  180 Tab 3     No Known Allergies  Past Medical History:   Diagnosis Date    [facility] 05[facility]2016    s/p ICD  Diabetes ([facility]     Hypertension     Nonischemic cardiomyopathy ([facility]      Past Surgical History:   Procedure Laterality Date    HX IMPLANTABLE CARDIOVERTER DEFIBRILLATOR  05/2016     Family History   Problem Relation Age of Onset    Heart Disease Mother 77    Diabetes Father     Stroke Maternal Grandmother      Social History     Tobacco Use   Smoking Status Former Smoker   Smokeless Tobacco Former User    Quit date: 5/2/2016          Review of Systems, additional:  Constitutional: negative  Eyes: negative  Respiratory: negative  Cardiovascular: negative  Gastrointestinal: negative  Musculoskeletal:negative  Neurological: negative  Behvioral/Psych: negative  Endocrine: negative  ENT: negative    Objective:     Visit Vitals  /70   Pulse (!) 57   Temp 97 °F (36.1 °C) (Temporal)   Wt 102.2 kg (225 lb 3.2 oz)   SpO2 96%   BMI 35.27 kg/m²     General:  alert, cooperative, no distress   Chest Wall: inspection normal - no chest wall deformities or tenderness, respiratory effort normal   Lung: clear to auscultation bilaterally   Heart:  normal rate and regular rhythm, S1 and S2 normal, no murmurs noted, no gallops noted   Abdomen: soft, non-tender. Bowel sounds normal. No masses,  no organomegaly   Extremities: extremities normal, atraumatic, no cyanosis or edema Skin: no rashes   Neuro: alert, oriented, normal speech, no focal findings or movement disorder noted     ECG 5/13/2022. Sinus bradycardia. Diffuse nondiagnostic ST and T wave abnormalities. Left atrial enlargement. Borderline first-degree AV block    Assessment/Plan:       ICD-10-CM ICD-9-CM    1. Cardiomyopathy, nonischemic ([facility] last EF 45-50%. Stable. Return in 6 months I42.8 425.4    2. Cardiac arrest ([facility] , 05/2016 I46.9 427.5    3. Essential hypertension with goal blood pressure less than 140/90, blood pressure controlled in the office today. I10 401.9    4. LVH (left ventricular hypertrophy) I51.7 429.3    5.  Hepatitis B carrier ([facility] B18.1 V02.61    6. Prediabetes R73.03 790.29    7. AICD (automatic cardioverter/defibrillator) present, device was alarming recently. Patient ultimately had a placement of a new right ventricular AICD lead along side of the old lead. No longer MRI compatible. [employer] Evera XT VR. Return in 1 year for office check and 3 months for [facility] Z95.810 V45.02    8.  Mixed hyperlipidemia E78.2 272.2

## 2022-06-15 ENCOUNTER — OFFICE VISIT (OUTPATIENT)
Dept: CARDIOLOGY CLINIC | Age: 62
End: 2022-06-15
Payer: MEDICAID

## 2022-06-15 DIAGNOSIS — I42.8 CARDIOMYOPATHY, NONISCHEMIC (HCC): ICD-10-CM

## 2022-06-15 DIAGNOSIS — Z95.810 AICD (AUTOMATIC CARDIOVERTER/DEFIBRILLATOR) PRESENT: Primary | ICD-10-CM

## 2022-06-15 PROCEDURE — 93295 DEV INTERROG REMOTE 1/2/MLT: CPT | Performed by: INTERNAL MEDICINE

## 2022-07-17 RX ORDER — AMLODIPINE BESYLATE 10 MG/1
TABLET ORAL
Qty: 90 TABLET | Refills: 0 | Status: SHIPPED | OUTPATIENT
Start: 2022-07-17 | End: 2022-11-03

## 2022-07-25 NOTE — LETTER
MORA Shen   [facility] - WOUND CARE  [address]  [place] [address]  [phone]      PATIENT NAME: Selena Proctor  : 1951  DATE: 22  MRN: [id number]      Billing Provider: MORA Shen  Level of Service:   Patient PCP Information     Provider PCP Type    Lizzette Meadows, MARQUISE, FNP-C General          Reason for Visit / Chief Complaint: Open wound of left thigh, sequela       History of Present Illness / Problem Focused Workflow     Selena Proctor is a 71 y.o female presents to clinic for follow up on wound to left inner thigh. Compliant with current POC including silver polymem. Wound remains tender to palpation. Patient would like to try an ointment instead of foam dressing. D/c polymem and use silvadene. Pertinent PMH includes DM, HTN, CAD with CABG x 10 years ago.  Denies fever or chills.       Review of Systems     Review of Systems   Constitutional: Positive for activity change. Negative for chills and fever.   HENT: Negative.    Eyes: Negative.    Respiratory: Negative.  Negative for apnea and chest tightness.    Cardiovascular: Negative.  Negative for chest pain and leg swelling.   Gastrointestinal: Negative.    Endocrine: Negative.    Genitourinary: Negative.    Musculoskeletal: Positive for arthralgias and myalgias.   Skin: Positive for color change and wound.   Allergic/Immunologic: Negative.    Neurological: Negative.    Hematological: Negative.    Psychiatric/Behavioral: Negative for behavioral problems.       Medical / Social / Family History     Past Medical History:   Diagnosis Date    Acid reflux     Coronary arteriosclerosis     Diabetes     Diabetes mellitus, type 2     Hypertension     Spondylosis without myelopathy or radiculopathy, lumbar region        Past Surgical History:   Procedure Laterality Date    APPENDECTOMY      BLOCK, NERVE, OBTURATOR Right 2019    Right Femoral/Obturator Nerve Block  Dr Headley     2/9/2022 3:25 PM    Ms. Odalys Mclean  [address] was seen in our office on 02/09/2022 for cardiac evaluation. She will be under this office care and will be having cardiac testing for further evaluation. Please feel free to contact our office if you have any questions regarding this patient.          Sincerely,                Saad Reyes MD  SECTION      x2    CORONARY ARTERY BYPASS GRAFT      KNEE ARTHROPLASTY Right     KNEE ARTHROSCOPY Left     SKIN GRAFT      Left Thigh    TOTAL HIP ARTHROPLASTY Bilateral     TUBAL LIGATION         Social History  Ms. Selena Proctor  reports that she has never smoked. She has never used smokeless tobacco. She reports previous alcohol use. She reports current drug use. Drug: Hydrocodone.    Family History  Ms.'s Selena Proctor family history includes Alcohol abuse in her father; Cancer in her father; Diabetes in her sister; Heart disease in her mother; Hypertension in her brother, mother, sister, and son; Mental illness in her maternal aunt.    Medications and Allergies     Medications  Outpatient Medications Marked as Taking for the 22 encounter (Office Visit) with MORA Shen   Medication Sig Dispense Refill    ACCU-CHEK DONNA PLUS TEST STRP Strp CHECK BLOOD SUGAR ONE TIME DAILY 100 strip 1    ACCU-CHEK SOFT DEV LANCETS Kit USE AS DIRECTED 100 each 1    ACCU-CHEK SOFTCLIX LANCETS Misc CHECK BLOOD SUGAR ONE TIME DAILY 100 each 1    alcohol swabs (BD ALCOHOL SWABS) PadM USE AS DIRECTED 90 each 2    aspirin (ECOTRIN) 81 MG EC tablet Take 81 mg by mouth once daily.       blood glucose control, low (TRUE METRIX LEVEL 1) Soln USE AS DIRECTED WITH GLUCOSE METER 1 each 0    chlorhexidine (HIBICLENS) 4 % external liquid Apply topically daily as needed (thigh wound). 120 mL 0    empagliflozin (JARDIANCE) 10 mg tablet Take 1 tablet (10 mg total) by mouth once daily. 90 tablet 1    ezetimibe (ZETIA) 10 mg tablet Take 1 tablet (10 mg total) by mouth once daily. 90 tablet 2    furosemide (LASIX) 40 MG tablet TAKE 1 TABLET EVERY DAY AS NEEDED 90 tablet 0    HYDROcodone-acetaminophen (NORCO)  mg per tablet Take 1 tablet by mouth every 8 (eight) hours. 90 tablet 0    [START ON 2022] HYDROcodone-acetaminophen (NORCO)  mg per tablet Take 1 tablet by mouth every 8 (eight) hours.  90 tablet 0    losartan-hydrochlorothiazide 100-25 mg (HYZAAR) 100-25 mg per tablet Take 1 tablet by mouth once daily. 90 tablet 2    metFORMIN (GLUCOPHAGE) 500 MG tablet Take 1 tablet (500 mg total) by mouth 2 (two) times daily with meals. 180 tablet 3    metoprolol succinate (TOPROL-XL) 100 MG 24 hr tablet Take 1 tablet (100 mg total) by mouth once daily. 90 tablet 2    miconazole (MONISTAT 7) 2 % vaginal cream Apply cream to external vaginal area for vaginal itching. 1 kit 0    montelukast (SINGULAIR) 10 mg tablet TAKE 1 TABLET EVERY DAY AS NEEDED 90 tablet 2    NIFEdipine (PROCARDIA-XL) 90 MG (OSM) 24 hr tablet TAKE 1 TABLET EVERY DAY 90 tablet 1    omeprazole (PRILOSEC) 40 MG capsule TAKE 1 CAPSULE EVERY DAY 90 capsule 0    pitavastatin calcium (LIVALO) 4 mg Tab Take one tablet by mouth three times per week on Monday, Wednesday, Friday. 90 tablet 2    potassium chloride 10% (KAYCIEL) 20 mEq/15 mL oral solution MIX 15ML (1 TABLESPOONFUL) IN 4 OUNCES OF LIQUID AND DRINK TWICE DAILY 2520 mL 1    silver sulfADIAZINE 1% (SILVADENE) 1 % cream Apply topically once daily. 50 g 2    spironolactone (ALDACTONE) 25 MG tablet TAKE 1 TABLET EVERY DAY 90 tablet 0    TRUE METRIX GLUCOSE METER kit USE AS DIRECTED 180 each 2    TRUEPLUS LANCETS 33 gauge Misc USE AS DIRECTED 200 each 0       Allergies  Review of patient's allergies indicates:   Allergen Reactions    Betadine [povidone-iodine] Itching       Physical Examination     Vitals:    07/26/22 1453   BP: (!) 144/80   Pulse: 90   Resp: 18   Temp: 98.2 °F (36.8 °C)     Physical Exam  Vitals and nursing note reviewed.   Constitutional:       Appearance: Normal appearance.   HENT:      Head: Normocephalic.   Cardiovascular:      Rate and Rhythm: Normal rate and regular rhythm.      Pulses: Normal pulses.      Heart sounds: Normal heart sounds.   Pulmonary:      Effort: Pulmonary effort is normal. No respiratory distress.   Chest:      Chest wall: No tenderness.    Musculoskeletal:         General: Swelling and tenderness present.   Skin:     General: Skin is warm and dry.      Findings: Erythema present.      Comments: See LDA for measurements and picture   Neurological:      Mental Status: She is alert and oriented to person, place, and time. Mental status is at baseline.   Psychiatric:         Mood and Affect: Mood normal.         Behavior: Behavior normal.         Thought Content: Thought content normal.         Judgment: Judgment normal.         Assessment and Plan      1. Open wound of left thigh, sequela             Active Problem List with Overview Notes    Diagnosis Date Noted    Open wound of left thigh 06/27/2022    Pain in left thigh  06/27/2022    Osteoarthrosis multiple sites, not specified as generalized 12/07/2021    Essential hypertension 07/28/2021    Type 2 diabetes mellitus without complication, without long-term current use of insulin 07/28/2021    Hip pain, right 05/24/2021    Spondylosis without myelopathy or radiculopathy, lumbar region       Plan:   Wash with baby shampoo and water or vashe.  Apply silvadene to wound bed  Cover with border gauze. Secure with paper tape if needed  Change daily   Monitor closely for s/s of infection including fever, chills, increase in pain, odor from wound, and increased redness from foot. Go to ER if any complications develop.   Keep leg elevated and avoid pressure on wound.   Diabetes:  Monitor glucose closely. Check fasting glucose and 2 hours after meals. HgA1C goal <7, fasting glucose , and 2 hours after meals <180  Hypertension:  Check blood pressure twice daily, goal <120/80  Diet:   Increase protein intake, avoid fried, fatty foods and foods high in simple carbs.   Vitamins:  Take vitamin C 1000 mg, zinc 50mg, vitamin d 5000 units, and a daily multivitamin. Anant is a good source of protein and nutrients to aid in wound healing.     Problem List Items Addressed This Visit        Orthopedic    Open  wound of left thigh - Primary          Future Appointments   Date Time Provider Department Center   8/1/2022 10:00 AM AWV NURSE, [facility] PRIMARY CARE [facility]    8/9/2022  9:00 AM [facility]ORA Shen [facility]   9/7/2022  8:30 AM ZACHARIAH Houser [facility]            Signature:  MORA Shen  [facility] - WOUND CARE  [address]  [place] [address]  [phone]    Date of encounter: 7/26/22

## 2022-08-04 NOTE — PROGRESS NOTES
I have personally seen and evaluated the device findings. Interrogation reviewed and I agree with assessment.     Angel Joyner

## 2022-08-16 ENCOUNTER — CLINICAL SUPPORT (OUTPATIENT)
Dept: CARDIOLOGY CLINIC | Age: 62
End: 2022-08-16
Payer: MEDICAID

## 2022-08-16 DIAGNOSIS — I42.8 CARDIOMYOPATHY, NONISCHEMIC (HCC): ICD-10-CM

## 2022-08-16 DIAGNOSIS — Z95.810 AICD (AUTOMATIC CARDIOVERTER/DEFIBRILLATOR) PRESENT: Primary | ICD-10-CM

## 2022-08-16 PROCEDURE — 93289 INTERROG DEVICE EVAL HEART: CPT | Performed by: INTERNAL MEDICINE

## 2022-09-19 NOTE — TELEPHONE ENCOUNTER
PCP: Franky Chapman MD    Last appt: Visit date not found  Future Appointments   Date Time Provider Gris Gandhi   11/18/2022  9:00 AM Franky Chapman MD [facility]       Requested Prescriptions     Pending Prescriptions Disp Refills    carvediloL (COREG) 25 mg tablet [Pharmacy Med Name: Carvedilol 25 MG Oral Tablet] 180 Tablet 0     Sig: Take 1 tablet by mouth twice daily           Other Comments:  Dr Peterson Hurtado pt.

## 2022-09-20 RX ORDER — CARVEDILOL 25 MG/1
TABLET ORAL
Qty: 180 TABLET | Refills: 3 | Status: SHIPPED | OUTPATIENT
Start: 2022-09-20

## 2022-10-05 NOTE — PROGRESS NOTES
I have personally seen and evaluated the device findings. Interrogation reviewed and I agree with assessment.     Dalia Jeong

## 2022-11-03 RX ORDER — AMLODIPINE BESYLATE 10 MG/1
TABLET ORAL
Qty: 90 TABLET | Refills: 0 | Status: SHIPPED | OUTPATIENT
Start: 2022-11-03

## 2022-12-13 DIAGNOSIS — I10 ESSENTIAL HYPERTENSION WITH GOAL BLOOD PRESSURE LESS THAN 140/90: ICD-10-CM

## 2022-12-14 RX ORDER — LISINOPRIL 20 MG/1
TABLET ORAL
Qty: 90 TABLET | Refills: 3 | Status: SHIPPED | OUTPATIENT
Start: 2022-12-14

## 2023-01-18 ENCOUNTER — OFFICE VISIT (OUTPATIENT)
Dept: CARDIOLOGY CLINIC | Age: 63
End: 2023-01-18
Payer: MEDICAID

## 2023-01-18 DIAGNOSIS — Z95.810 AICD (AUTOMATIC CARDIOVERTER/DEFIBRILLATOR) PRESENT: Primary | ICD-10-CM

## 2023-01-18 DIAGNOSIS — I42.8 CARDIOMYOPATHY, NONISCHEMIC (HCC): ICD-10-CM

## 2023-01-20 ENCOUNTER — OFFICE VISIT (OUTPATIENT)
Dept: CARDIOLOGY CLINIC | Age: 63
End: 2023-01-20
Payer: MEDICAID

## 2023-01-20 VITALS
SYSTOLIC BLOOD PRESSURE: 130 MMHG | HEART RATE: 60 BPM | OXYGEN SATURATION: 97 % | BODY MASS INDEX: 34.46 KG/M2 | WEIGHT: 220 LBS | DIASTOLIC BLOOD PRESSURE: 72 MMHG

## 2023-01-20 DIAGNOSIS — Z95.810 AICD (AUTOMATIC CARDIOVERTER/DEFIBRILLATOR) PRESENT: Primary | ICD-10-CM

## 2023-01-20 NOTE — PATIENT INSTRUCTIONS
New Location Address- projected for the month of February 2023    [address] [place] [address]

## 2023-01-20 NOTE — PROGRESS NOTES
Identified pt with two pt identifiers(name and ). Reviewed record in preparation for visit and have obtained necessary documentation. Myra Gonsalez presents today for   Chief Complaint   Patient presents with    Follow-up       Pt denies  DIZZINESS, SOB, CHEST PAIN/ PRESSURE, FATIGUE/WEAKNESS, HEADACHES, SWELLING. Myra Gonsalez preferred language for health care discussion is english/other. Personal Protective Equipment:   Personal Protective Equipment was used including: mask-surgical and hands-gloves. Patient was placed on no precaution(s). Patient was masked. Precautions:   Patient currently on None  Patient currently roomed with door closed. Is someone accompanying this pt? no    Is the patient using any DME equipment during [address]? no    Depression Screening:  3 most recent PHQ Screens 2023   Little interest or pleasure in doing things Not at all   Feeling down, depressed, irritable, or hopeless Not at all   Total Score PHQ 2 0       Learning Assessment:  Learning Assessment 2016   PRIMARY LEARNER Patient   HIGHEST LEVEL OF EDUCATION - PRIMARY LEARNER  SOME COLLEGE   BARRIERS PRIMARY LEARNER NONE   PRIMARY LANGUAGE ENGLISH   LEARNER PREFERENCE PRIMARY LISTENING   ANSWERED BY patient   RELATIONSHIP SELF       Abuse Screening:  Abuse Screening Questionnaire 2016   Do you ever feel afraid of your partner? N   Are you in a relationship with someone who physically or mentally threatens you? N   Is it safe for you to go home? Y       Fall Risk  Fall Risk Assessment, last 12 mths 2021   Able to walk? Yes   Fall in past 12 months? 0   Do you feel unsteady? 0   Are you worried about falling 0       Pt currently taking Anticoagulant therapy? no  Pt currently taking Antiplatelet therapy? yes    Coordination of Care:  1. Have you been to the ER, urgent care clinic since your last visit? Hospitalized since your last visit? no    2.  Have you seen or consulted any other health care providers outside of the [address] [place] [address] [place] since your last visit? Include any pap smears or colon screening. no      Please see Red banners under Allergies and Med Rec to remove outside inquires. All correct information has been verified with patient and added to chart.      Medication's patient's would liked removed has been marked not taking to be removed per Verbal order and read back per Pattie Wagner MD

## 2023-01-24 PROCEDURE — 93296 REM INTERROG EVL PM/IDS: CPT | Performed by: INTERNAL MEDICINE

## 2023-01-24 PROCEDURE — 93295 DEV INTERROG REMOTE 1/2/MLT: CPT | Performed by: INTERNAL MEDICINE

## 2023-02-01 NOTE — PROGRESS NOTES
single AICD. 10 years left on battery. Lead impedance and threshold WNL. V sensed 100 %. OP Vol WNL. 0 events.

## 2023-02-04 NOTE — PROGRESS NOTES
Subjective:      Yasmine Lebron is in the office today for cardiac reevaluation. She is a 63-year-old woman who had a cardiac arrest in the setting of hypokalemia on 05/02/2016. During her hospitalization, she was found to have an ejection fraction of  25%. Prior to her discharge, her ejection fraction improved to 45-50%. She did have a previous cardiac catheterization done in 2014, which showed noncritical coronary artery disease. She had a complicated in-hospital course with acute kidney injury, altered mental status/encephalopathy, and aspiration pneumonia. She ultimately had an AICD placed by Dr. Jaquelin Herrera. In the office today, she reports that she is doing \"all right \". She has had no chest pain or shortness of breath. She has no specific complaints in the office today. She required a new lead placement on 3/4/2022. She has been following with Dr. Jaquelin Herrera. Patient Active Problem List    Diagnosis Date Noted    LVH (left ventricular hypertrophy) 07/21/2016    Cardiac arrest ([facility] 05/31/2016    AICD (automatic cardioverter/defibrillator) present 05/31/2016    Essential hypertension with goal blood pressure less than 140/90 05/18/2016    Prediabetes 05/18/2016    Mixed hyperlipidemia 05/18/2016    Hepatitis B carrier ([facility] 05/18/2016    Anemia 05/18/2016    Cardiomyopathy, nonischemic ([facility] 05/04/2016     Current Outpatient Medications   Medication Sig Dispense Refill    lisinopriL (PRINIVIL, ZESTRIL) 20 mg tablet Take 1 tablet by mouth once daily 90 Tablet 3    amLODIPine (NORVASC) 10 mg tablet Take 1 tablet by mouth once daily 90 Tablet 0    carvediloL (COREG) 25 mg tablet Take 1 tablet by mouth twice daily. 180 Tablet 3    ascorbic acid, vitamin C, (VITAMIN C) 500 mg tablet Take  by mouth. aspirin delayed-release 81 mg tablet Take 2 Tabs by mouth daily.  180 Tab 3     No Known Allergies  Past Medical History:   Diagnosis Date    Cardiac arrest with ventricular fibrillation ([facility] 05/2016 s/p ICD    [facility]     Hypertension     Nonischemic cardiomyopathy ([facility]      Past Surgical History:   Procedure Laterality Date    HX IMPLANTABLE CARDIOVERTER DEFIBRILLATOR  05/2016     Family History   Problem Relation Age of Onset    Heart Disease Mother 77    Diabetes Father     Stroke Maternal Grandmother      Social History     Tobacco Use   Smoking Status Former   Smokeless Tobacco Former    Quit date: 5/2/2016          Review of Systems, additional:  Constitutional: negative  Eyes: negative  Respiratory: negative  Cardiovascular: negative  Gastrointestinal: negative  Musculoskeletal:negative  Neurological: negative  Behvioral/Psych: negative  Endocrine: negative  ENT: negative    Objective:     Visit Vitals  /72   Pulse 60   Wt 99.8 kg (220 lb)   SpO2 97%   BMI 34.46 kg/m²     General:  alert, cooperative, no distress   Chest Wall: inspection normal - no chest wall deformities or tenderness, respiratory effort normal   Lung: clear to auscultation bilaterally   Heart:  normal rate and regular rhythm, S1 and S2 normal, no murmurs noted, no gallops noted   Abdomen: soft, non-tender. Bowel sounds normal. No masses,  no organomegaly   Extremities: extremities normal, atraumatic, no cyanosis or edema Skin: no rashes   Neuro: alert, oriented, normal speech, no focal findings or movement disorder noted     ECG 5/13/2022. Sinus bradycardia. Diffuse nondiagnostic ST and T wave abnormalities. Left atrial enlargement. Borderline first-degree AV block    Assessment/Plan:       ICD-10-CM ICD-9-CM    1. Cardiomyopathy, nonischemic ([facility] last EF 45-50%. Stable. Return in 6 months I42.8 425.4    2. Cardiac arrest ([facility] , 05/2016 I46.9 427.5    3. Essential hypertension with goal blood pressure less than 140/90, blood pressure controlled in the office today. I10 401.9    4. LVH (left ventricular hypertrophy) I51.7 429.3    5. Hepatitis B carrier (HCC) B18.1 V02.61    6. Prediabetes R73.03 790.29    7. AICD (automatic cardioverter/defibrillator) present, Patient required placement of a new right ventricular AICD lead along side of the old lead. No longer MRI compatible. [employer] Evera XT VR. Device check 12/2/2022. Longevity 10.5 years. No therapy. Activity 7.6 hours/day Z95.810 V45.02    8.  Mixed hyperlipidemia E78.2 272.2

## 2023-02-14 RX ORDER — AMLODIPINE BESYLATE 10 MG/1
1 TABLET ORAL DAILY
COMMUNITY
Start: 2022-11-03 | End: 2023-02-14 | Stop reason: SDUPTHER

## 2023-02-14 RX ORDER — CARVEDILOL 25 MG/1
1 TABLET ORAL 2 TIMES DAILY
COMMUNITY
Start: 2022-09-20

## 2023-02-14 RX ORDER — LISINOPRIL 20 MG/1
1 TABLET ORAL DAILY
COMMUNITY
Start: 2022-12-14 | End: 2023-02-14 | Stop reason: SDUPTHER

## 2023-02-14 RX ORDER — LISINOPRIL 20 MG/1
1 TABLET ORAL DAILY
COMMUNITY
Start: 2022-12-14

## 2023-02-14 RX ORDER — AMLODIPINE BESYLATE 10 MG/1
10 TABLET ORAL DAILY
Qty: 90 TABLET | Refills: 2 | Status: SHIPPED | OUTPATIENT
Start: 2023-02-14 | End: 2023-02-19 | Stop reason: SDUPTHER

## 2023-02-14 RX ORDER — ASPIRIN 81 MG/1
162 TABLET ORAL DAILY
COMMUNITY
Start: 2018-03-14

## 2023-02-14 NOTE — TELEPHONE ENCOUNTER
PCP: Jose Dempsey MD, MD    Last appt: [unfilled]  Future Appointments   Date Time Provider Harpal Petit   5/16/2023 11:15 AM CSI, PACER HV [facility] BS AMB   7/21/2023 11:20 AM Haley Zelaya MD [facility] BS [facility]       Requested Prescriptions     Pending Prescriptions Disp Refills    amLODIPine (NORVASC) 10 MG tablet 90 tablet 1     Sig: Take 1 tablet by mouth daily

## 2023-02-17 NOTE — TELEPHONE ENCOUNTER
PCP: Carolyn Ruano MD, MD    Last appt: [unfilled]  Future Appointments   Date Time Provider Harpal Petit   5/16/2023 11:15 AM CSI, PACER HV [facility] BS AMB   7/21/2023 11:20 AM Lesly Zimmer MD [facility] BS AMB       Requested Prescriptions     Pending Prescriptions Disp Refills    amLODIPine (NORVASC) 10 MG tablet 90 tablet 2     Sig: Take 1 tablet by mouth daily

## 2023-02-19 RX ORDER — AMLODIPINE BESYLATE 10 MG/1
10 TABLET ORAL DAILY
Qty: 90 TABLET | Refills: 2 | Status: SHIPPED | OUTPATIENT
Start: 2023-02-19

## 2023-05-10 RX ORDER — AMLODIPINE BESYLATE 10 MG/1
TABLET ORAL
Qty: 90 TABLET | Refills: 0 | OUTPATIENT
Start: 2023-05-10

## 2023-05-16 ENCOUNTER — PROCEDURE VISIT (OUTPATIENT)
Age: 63
End: 2023-05-16
Payer: MEDICAID

## 2023-05-16 DIAGNOSIS — I42.8 OTHER CARDIOMYOPATHIES (HCC): ICD-10-CM

## 2023-05-16 DIAGNOSIS — Z95.810 PRESENCE OF AUTOMATIC (IMPLANTABLE) CARDIAC DEFIBRILLATOR: Primary | ICD-10-CM

## 2023-05-16 PROCEDURE — 93289 INTERROG DEVICE EVAL HEART: CPT | Performed by: INTERNAL MEDICINE

## 2023-08-04 ENCOUNTER — OFFICE VISIT (OUTPATIENT)
Age: 63
End: 2023-08-04

## 2023-08-04 VITALS
WEIGHT: 215 LBS | HEIGHT: 67 IN | HEART RATE: 60 BPM | SYSTOLIC BLOOD PRESSURE: 137 MMHG | OXYGEN SATURATION: 95 % | DIASTOLIC BLOOD PRESSURE: 78 MMHG | BODY MASS INDEX: 33.74 KG/M2

## 2023-08-04 DIAGNOSIS — Z95.810 PRESENCE OF AUTOMATIC (IMPLANTABLE) CARDIAC DEFIBRILLATOR: Primary | ICD-10-CM

## 2023-08-04 ASSESSMENT — PATIENT HEALTH QUESTIONNAIRE - PHQ9
SUM OF ALL RESPONSES TO PHQ QUESTIONS 1-9: 0
SUM OF ALL RESPONSES TO PHQ QUESTIONS 1-9: 0
1. LITTLE INTEREST OR PLEASURE IN DOING THINGS: 0
SUM OF ALL RESPONSES TO PHQ QUESTIONS 1-9: 0
SUM OF ALL RESPONSES TO PHQ QUESTIONS 1-9: 0
SUM OF ALL RESPONSES TO PHQ9 QUESTIONS 1 & 2: 0
2. FEELING DOWN, DEPRESSED OR HOPELESS: 0

## 2023-08-04 NOTE — PROGRESS NOTES
Valerie Trujillomarcelo presents today for   Chief Complaint   Patient presents with    Follow-up     7 month       Valerie Sánchez preferred language for health care discussion is english/other. Is someone accompanying this pt? no    Is the patient using any DME equipment during OV? no    Depression Screening:  Depression: Not at risk    PHQ-2 Score: 0        Learning Assessment:  Who is the primary learner? Patient    What is the preferred language for health care of the primary learner? ENGLISH    How does the primary learner prefer to learn new concepts? DEMONSTRATION    Answered By patient    Relationship to Learner SELF           Pt currently taking Anticoagulant therapy? no    Pt currently taking Antiplatelet therapy ? Aspirin 81       Coordination of Care:  1. Have you been to the ER, urgent care clinic since your last visit? Hospitalized since your last visit? no    2. Have you seen or consulted any other health care providers outside of the [address] [place] [address] since your last visit? Include any pap smears or colon screening.  no

## 2023-08-23 ENCOUNTER — OFFICE VISIT (OUTPATIENT)
Age: 63
End: 2023-08-23
Payer: MEDICAID

## 2023-08-23 DIAGNOSIS — Z95.810 PRESENCE OF AUTOMATIC (IMPLANTABLE) CARDIAC DEFIBRILLATOR: Primary | ICD-10-CM

## 2023-08-23 DIAGNOSIS — I46.9 CARDIAC ARREST, CAUSE UNSPECIFIED (HCC): ICD-10-CM

## 2023-08-26 NOTE — PROGRESS NOTES
Subjective:      Ramila Han is in the office today for cardiac reevaluation. She is a 32-year-old woman who had a cardiac arrest in the setting of hypokalemia on 05/02/2016. During her hospitalization, she was found to have an ejection fraction of  25%. Prior to her discharge, her ejection fraction improved to 45-50%. She did have a previous cardiac catheterization done in 2014, which showed noncritical coronary artery disease. She had a complicated in-hospital course with acute kidney injury, altered mental status/encephalopathy, and aspiration pneumonia. She ultimately had an AICD placed by Dr. Joel Hawley. In the office today, she reports that she is doing \"okay\". She has had no chest pain or shortness of breath. She has no specific complaints in the office today. She required a new lead placement on 3/4/2022. She has been following with Dr. Joel Hawley. Patient Active Problem List    Diagnosis Date Noted    LVH (left ventricular hypertrophy) 07/21/2016    Cardiac arrest ([address]) 05/31/2016    AICD (automatic cardioverter/defibrillator) present 05/31/2016    Essential hypertension with goal blood pressure less than 140/90 05/18/2016    Prediabetes 05/18/2016    Mixed hyperlipidemia 05/18/2016    Hepatitis B carrier ([address]) 05/18/2016    Anemia 05/18/2016    Cardiomyopathy, nonischemic ([address]) 05/04/2016     Current Outpatient Medications   Medication Sig Dispense Refill    lisinopriL (PRINIVIL, ZESTRIL) 20 mg tablet Take 1 tablet by mouth once daily 90 Tablet 3    amLODIPine (NORVASC) 10 mg tablet Take 1 tablet by mouth once daily 90 Tablet 0    carvediloL (COREG) 25 mg tablet Take 1 tablet by mouth twice daily. 180 Tablet 3    ascorbic acid, vitamin C, (VITAMIN C) 500 mg tablet Take  by mouth. aspirin delayed-release 81 mg tablet Take 2 Tabs by mouth daily.  180 Tab 3     No Known Allergies  Past Medical History:   Diagnosis Date    Cardiac arrest with ventricular fibrillation ([address]) 05/2016

## 2023-09-12 PROCEDURE — 93295 DEV INTERROG REMOTE 1/2/MLT: CPT | Performed by: INTERNAL MEDICINE

## 2023-09-12 PROCEDURE — 93296 REM INTERROG EVL PM/IDS: CPT | Performed by: INTERNAL MEDICINE

## 2023-09-12 NOTE — PROGRESS NOTES
[facility] :  single AICD. 10 years left on battery. Lead impedance and threshold WNL. V sensed 100 %. OP Vol WNL.  0 episodes

## 2023-10-01 RX ORDER — CARVEDILOL 25 MG/1
25 TABLET ORAL 2 TIMES DAILY
Qty: 180 TABLET | Refills: 3 | Status: SHIPPED | OUTPATIENT
Start: 2023-10-01

## 2023-12-28 RX ORDER — LISINOPRIL 20 MG/1
20 TABLET ORAL DAILY
Qty: 90 TABLET | Refills: 3 | Status: SHIPPED | OUTPATIENT
Start: 2023-12-28

## 2024-02-27 RX ORDER — AMLODIPINE BESYLATE 10 MG/1
10 TABLET ORAL DAILY
Qty: 90 TABLET | Refills: 3 | Status: SHIPPED | OUTPATIENT
Start: 2024-02-27

## 2024-03-06 ENCOUNTER — NURSE ONLY (OUTPATIENT)
Age: 64
End: 2024-03-06

## 2024-03-06 DIAGNOSIS — I42.8 OTHER CARDIOMYOPATHIES (HCC): ICD-10-CM

## 2024-03-06 DIAGNOSIS — Z95.810 PRESENCE OF AUTOMATIC (IMPLANTABLE) CARDIAC DEFIBRILLATOR: Primary | ICD-10-CM

## 2024-05-22 ENCOUNTER — OFFICE VISIT (OUTPATIENT)
Age: 64
End: 2024-05-22
Payer: MEDICAID

## 2024-05-22 VITALS
OXYGEN SATURATION: 96 % | DIASTOLIC BLOOD PRESSURE: 84 MMHG | SYSTOLIC BLOOD PRESSURE: 122 MMHG | BODY MASS INDEX: 33.43 KG/M2 | WEIGHT: 213 LBS | HEART RATE: 61 BPM | HEIGHT: 67 IN

## 2024-05-22 DIAGNOSIS — I42.8 CARDIOMYOPATHY, NONISCHEMIC (HCC): Primary | ICD-10-CM

## 2024-05-22 PROCEDURE — 3079F DIAST BP 80-89 MM HG: CPT | Performed by: INTERNAL MEDICINE

## 2024-05-22 PROCEDURE — 99215 OFFICE O/P EST HI 40 MIN: CPT | Performed by: INTERNAL MEDICINE

## 2024-05-22 PROCEDURE — 3074F SYST BP LT 130 MM HG: CPT | Performed by: INTERNAL MEDICINE

## 2024-05-22 RX ORDER — MULTIVIT WITH MINERALS/LUTEIN
1000 TABLET ORAL DAILY
COMMUNITY
End: 2024-05-22

## 2024-05-22 RX ORDER — MULTIVIT WITH MINERALS/LUTEIN
1000 TABLET ORAL DAILY
COMMUNITY

## 2024-05-22 ASSESSMENT — ANXIETY QUESTIONNAIRES
2. NOT BEING ABLE TO STOP OR CONTROL WORRYING: NOT AT ALL
6. BECOMING EASILY ANNOYED OR IRRITABLE: NOT AT ALL
7. FEELING AFRAID AS IF SOMETHING AWFUL MIGHT HAPPEN: NOT AT ALL
GAD7 TOTAL SCORE: 0
5. BEING SO RESTLESS THAT IT IS HARD TO SIT STILL: NOT AT ALL
1. FEELING NERVOUS, ANXIOUS, OR ON EDGE: NOT AT ALL
4. TROUBLE RELAXING: NOT AT ALL
3. WORRYING TOO MUCH ABOUT DIFFERENT THINGS: NOT AT ALL

## 2024-05-22 ASSESSMENT — PATIENT HEALTH QUESTIONNAIRE - PHQ9
SUM OF ALL RESPONSES TO PHQ QUESTIONS 1-9: 0
SUM OF ALL RESPONSES TO PHQ QUESTIONS 1-9: 0
SUM OF ALL RESPONSES TO PHQ9 QUESTIONS 1 & 2: 0
SUM OF ALL RESPONSES TO PHQ QUESTIONS 1-9: 0
2. FEELING DOWN, DEPRESSED OR HOPELESS: NOT AT ALL
SUM OF ALL RESPONSES TO PHQ QUESTIONS 1-9: 0
1. LITTLE INTEREST OR PLEASURE IN DOING THINGS: NOT AT ALL

## 2024-05-22 NOTE — PROGRESS NOTES
Yasemin Limon presents today for   Chief Complaint   Patient presents with    Follow-up     6 month       Yasemin Limon preferred language for health care discussion is english/other.    Is someone accompanying this pt? no    Is the patient using any DME equipment during OV? no    Depression Screening:  Depression: Not at risk (5/22/2024)    PHQ-2     PHQ-2 Score: 0        Learning Assessment:  Who is the primary learner? Patient    What is the preferred language for health care of the primary learner? ENGLISH    How does the primary learner prefer to learn new concepts? DEMONSTRATION    Answered By patient    Relationship to Learner SELF           Pt currently taking Anticoagulant therapy? no    Pt currently taking Antiplatelet therapy ? no      Coordination of Care:  1. Have you been to the ER, urgent care clinic since your last visit? Hospitalized since your last visit? no    2. Have you seen or consulted any other health care providers outside of the [facility] since your last visit? Include any pap smears or colon screening. no

## 2024-05-22 NOTE — PATIENT INSTRUCTIONS
Dr. Campbell recommends the following PCPs:    Dr. Kylee Alan  [address] [place] [address], [address] [address] [phone]     Dr. Johnston  [address] [place] [address], [address] [address] [phone]

## 2024-05-22 NOTE — PROGRESS NOTES
Yasemin Limon    Chief Complaint   Patient presents with    Follow-up     6 month     cardiomyopathy    HPI    Yasemin Limon is a 63 y.o. AAFwho had a cardiac arrest in the setting of hypokalemia on 05/02/2016.  During her hospitalization, she was found to have an ejection fraction of  25%.  Prior to her discharge, her ejection fraction improved to 45-50%.  She did have a previous cardiac catheterization done in 2014, which showed noncritical coronary artery disease.  She had a complicated in-hospital course with acute kidney injury, altered mental status/encephalopathy, and aspiration pneumonia.  She ultimately had an AICD placed by Dr. Li. She needed a new lead 3/2022.    Overall feels okay but does not follow with doctors. She doesn't have a PCP and been almost a year since seen by her primary cardiologist. I dont think her EF was rechecked since 2016, I obtained and reviewed this echo today with her.    Past Medical History:   Diagnosis Date    Cardiac arrest with ventricular fibrillation (HCC) 05/2016    s/p ICD    Diabetes (HCC)     Hypertension     Nonischemic cardiomyopathy (HCC)        Past Surgical History:   Procedure Laterality Date    CARDIAC DEFIBRILLATOR PLACEMENT  05/2016       Current Outpatient Medications   Medication Sig Dispense Refill    Biotin 5000 MCG CAPS Take 1 mcg by mouth daily      Ascorbic Acid (VITAMIN C) 1000 MG tablet Take 1 tablet by mouth daily      amLODIPine (NORVASC) 10 MG tablet Take 1 tablet by mouth once daily 90 tablet 3    lisinopril (PRINIVIL;ZESTRIL) 20 MG tablet Take 1 tablet by mouth once daily 90 tablet 3    carvedilol (COREG) 25 MG tablet Take 1 tablet by mouth in the morning and at bedtime 180 tablet 3     No current facility-administered medications for this visit.       No Known Allergies    Social History     Socioeconomic History    Marital status: Single     Spouse name: Not on file    Number of children: Not on file    Years of education: Not on file

## 2024-05-31 ENCOUNTER — NURSE ONLY (OUTPATIENT)
Age: 64
End: 2024-05-31

## 2024-05-31 DIAGNOSIS — I42.8 CARDIOMYOPATHY, NONISCHEMIC (HCC): ICD-10-CM

## 2024-05-31 DIAGNOSIS — Z95.810 PRESENCE OF AUTOMATIC (IMPLANTABLE) CARDIAC DEFIBRILLATOR: Primary | ICD-10-CM

## 2024-06-11 ENCOUNTER — TELEPHONE (OUTPATIENT)
Age: 64
End: 2024-06-11

## 2024-06-11 NOTE — TELEPHONE ENCOUNTER
----- Message from Nasrin Smith RN sent at 6/11/2024  8:57 AM EDT -----    ----- Message -----  From: Adela Campbell DO  Sent: 6/10/2024  11:32 AM EDT  To: Nasrin Smith RN    Her echo is similar to prior/ unchanged  She should have follow up with Dr. 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 Bland  45 mins, incl obtaining and reviewing records  Info given for PCP as well

## 2024-06-11 NOTE — TELEPHONE ENCOUNTER
VSS.  Slightly tachy with activity.  Ambulated long distance in parr with SBA as well as to the bathroom.  Reminded patient to save voids but feels as though she is emptying her bladder well. +Flatus and had 2 stools overnight.  Drinking fluids and supplements and taking small amounts of food.  Pain controlled with tylenol and occasional oxycodone. Lap sites CDI. Possible discharge tomorrow.    Verbal order and read back per Adela Campbell, :    Her echo is similar to prior/ unchanged   She should have follow up with Dr. Bland to discuss further           I called and let her know that there was no changes compared to the previous one, and to follow up with Dr. Bland.

## 2024-07-19 NOTE — RESULT ENCOUNTER NOTE
Device check personally reviewed by me.  Normal device function on interrogation.  Short episodes of atrial fibrillation noted.  Will continue to monitor.  See scanned interrogation document for complete details.

## 2024-09-04 ENCOUNTER — NURSE ONLY (OUTPATIENT)
Age: 64
End: 2024-09-04
Payer: MEDICAID

## 2024-09-04 DIAGNOSIS — Z95.810 PRESENCE OF AUTOMATIC (IMPLANTABLE) CARDIAC DEFIBRILLATOR: Primary | ICD-10-CM

## 2024-09-04 DIAGNOSIS — I42.8 CARDIOMYOPATHY, NONISCHEMIC (HCC): ICD-10-CM

## 2024-09-04 PROCEDURE — 93295 DEV INTERROG REMOTE 1/2/MLT: CPT | Performed by: INTERNAL MEDICINE

## 2024-09-04 PROCEDURE — 93296 REM INTERROG EVL PM/IDS: CPT | Performed by: INTERNAL MEDICINE

## 2024-09-05 NOTE — RESULT ENCOUNTER NOTE
Device check personally reviewed by me.  Normal device function on interrogation.  Short episodes of atrial fibrillation noted, would continue to monitor closely.  See scanned interrogation document for complete details.
room air

## 2024-09-18 ENCOUNTER — OFFICE VISIT (OUTPATIENT)
Facility: CLINIC | Age: 64
End: 2024-09-18
Payer: MEDICAID

## 2024-09-18 VITALS
BODY MASS INDEX: 33.43 KG/M2 | SYSTOLIC BLOOD PRESSURE: 125 MMHG | RESPIRATION RATE: 20 BRPM | DIASTOLIC BLOOD PRESSURE: 84 MMHG | OXYGEN SATURATION: 95 % | TEMPERATURE: 97.6 F | WEIGHT: 213 LBS | HEART RATE: 55 BPM | HEIGHT: 67 IN

## 2024-09-18 DIAGNOSIS — Z71.89 ACP (ADVANCE CARE PLANNING): ICD-10-CM

## 2024-09-18 DIAGNOSIS — Z13.0 SCREENING FOR DEFICIENCY ANEMIA: ICD-10-CM

## 2024-09-18 DIAGNOSIS — E78.2 MIXED HYPERLIPIDEMIA: ICD-10-CM

## 2024-09-18 DIAGNOSIS — Z13.228 SCREENING FOR METABOLIC DISORDER: ICD-10-CM

## 2024-09-18 DIAGNOSIS — Z12.31 ENCOUNTER FOR SCREENING MAMMOGRAM FOR MALIGNANT NEOPLASM OF BREAST: ICD-10-CM

## 2024-09-18 DIAGNOSIS — Z13.1 SCREENING FOR DIABETES MELLITUS (DM): ICD-10-CM

## 2024-09-18 DIAGNOSIS — Z13.29 SCREENING FOR THYROID DISORDER: ICD-10-CM

## 2024-09-18 DIAGNOSIS — I25.2 HISTORY OF HEART ATTACK: ICD-10-CM

## 2024-09-18 DIAGNOSIS — Z13.89 SCREENING FOR BLOOD OR PROTEIN IN URINE: ICD-10-CM

## 2024-09-18 DIAGNOSIS — Z95.810 AICD (AUTOMATIC CARDIOVERTER/DEFIBRILLATOR) PRESENT: ICD-10-CM

## 2024-09-18 DIAGNOSIS — I10 ESSENTIAL HYPERTENSION WITH GOAL BLOOD PRESSURE LESS THAN 140/90: ICD-10-CM

## 2024-09-18 DIAGNOSIS — Z13.220 SCREENING FOR CHOLESTEROL LEVEL: ICD-10-CM

## 2024-09-18 DIAGNOSIS — R73.03 PREDIABETES: ICD-10-CM

## 2024-09-18 DIAGNOSIS — I42.8 CARDIOMYOPATHY, NONISCHEMIC (HCC): ICD-10-CM

## 2024-09-18 DIAGNOSIS — Z12.11 SCREEN FOR COLON CANCER: ICD-10-CM

## 2024-09-18 DIAGNOSIS — Z28.21 INFLUENZA VACCINATION DECLINED: Primary | ICD-10-CM

## 2024-09-18 DIAGNOSIS — Z76.89 ENCOUNTER TO ESTABLISH CARE: ICD-10-CM

## 2024-09-18 DIAGNOSIS — Z28.21 23-POLYVALENT PNEUMOCOCCAL POLYSACCHARIDE VACCINE DECLINED: ICD-10-CM

## 2024-09-18 PROCEDURE — 3074F SYST BP LT 130 MM HG: CPT | Performed by: NURSE PRACTITIONER

## 2024-09-18 PROCEDURE — 99203 OFFICE O/P NEW LOW 30 MIN: CPT | Performed by: NURSE PRACTITIONER

## 2024-09-18 PROCEDURE — 3079F DIAST BP 80-89 MM HG: CPT | Performed by: NURSE PRACTITIONER

## 2024-09-18 RX ORDER — LISINOPRIL 20 MG/1
20 TABLET ORAL DAILY
Qty: 90 TABLET | Refills: 3 | Status: SHIPPED | OUTPATIENT
Start: 2024-09-18

## 2024-09-18 RX ORDER — AMLODIPINE BESYLATE 10 MG/1
10 TABLET ORAL DAILY
Qty: 90 TABLET | Refills: 3 | Status: SHIPPED | OUTPATIENT
Start: 2024-09-18

## 2024-09-18 RX ORDER — CARVEDILOL 25 MG/1
25 TABLET ORAL 2 TIMES DAILY
Qty: 180 TABLET | Refills: 3 | Status: SHIPPED | OUTPATIENT
Start: 2024-09-18

## 2024-09-30 ENCOUNTER — HOSPITAL ENCOUNTER (OUTPATIENT)
Facility: HOSPITAL | Age: 64
Setting detail: SPECIMEN
Discharge: HOME | End: 2024-10-03

## 2024-09-30 ENCOUNTER — LAB (OUTPATIENT)
Facility: CLINIC | Age: 64
End: 2024-09-30

## 2024-09-30 DIAGNOSIS — Z13.220 SCREENING FOR CHOLESTEROL LEVEL: ICD-10-CM

## 2024-09-30 DIAGNOSIS — Z13.89 SCREENING FOR BLOOD OR PROTEIN IN URINE: ICD-10-CM

## 2024-09-30 DIAGNOSIS — Z13.228 SCREENING FOR METABOLIC DISORDER: ICD-10-CM

## 2024-09-30 DIAGNOSIS — Z13.0 SCREENING FOR DEFICIENCY ANEMIA: ICD-10-CM

## 2024-09-30 DIAGNOSIS — Z13.29 SCREENING FOR THYROID DISORDER: ICD-10-CM

## 2024-09-30 DIAGNOSIS — Z13.1 SCREENING FOR DIABETES MELLITUS (DM): ICD-10-CM

## 2024-09-30 LAB — SENTARA SPECIMEN COLLECTION: NORMAL

## 2024-09-30 PROCEDURE — 99001 SPECIMEN HANDLING PT-LAB: CPT

## 2024-10-01 LAB
A/G RATIO: 2 RATIO (ref 1.1–2.6)
ALBUMIN: 4.5 G/DL (ref 3.5–5)
ALP BLD-CCNC: 86 U/L (ref 40–120)
ALT SERPL-CCNC: 15 U/L (ref 5–40)
ANION GAP SERPL CALCULATED.3IONS-SCNC: 11 MMOL/L (ref 3–15)
AST SERPL-CCNC: 17 U/L (ref 10–37)
BACTERIA: NEGATIVE
BASOPHILS ABSOLUTE: 0 K/UL (ref 0–0.2)
BASOPHILS RELATIVE PERCENT: 0 % (ref 0–2)
BILIRUB SERPL-MCNC: 0.2 MG/DL (ref 0.2–1.2)
BILIRUB SERPL-MCNC: NEGATIVE MG/DL
BLOOD: ABNORMAL
BUN BLDV-MCNC: 18 MG/DL (ref 6–22)
CALCIUM SERPL-MCNC: 10.1 MG/DL (ref 8.4–10.5)
CHLORIDE BLD-SCNC: 105 MMOL/L (ref 98–110)
CHOLESTEROL, TOTAL: 239 MG/DL (ref 110–200)
CHOLESTEROL/HDL RATIO: 4.1 (ref 0–5)
CLARITY, UA: CLEAR
CO2: 26 MMOL/L (ref 20–32)
COLOR, UA: YELLOW
CREAT SERPL-MCNC: 0.6 MG/DL (ref 0.8–1.4)
EOSINOPHIL # BLD: 2 % (ref 0–6)
EOSINOPHILS ABSOLUTE: 0.2 K/UL (ref 0–0.5)
EPITHELIAL CELLS: ABNORMAL /HPF
ESTIMATED AVERAGE GLUCOSE: 134 MG/DL (ref 91–123)
GFR, ESTIMATED: >60 ML/MIN/1.73 SQ.M.
GLOBULIN: 2.3 G/DL (ref 2–4)
GLUCOSE: 103 MG/DL (ref 70–99)
GLUCOSE: NEGATIVE MG/DL
HBA1C MFR BLD: 6.3 % (ref 4.8–5.6)
HCT VFR BLD CALC: 40.7 % (ref 35.1–48)
HDLC SERPL-MCNC: 58 MG/DL
HEMOGLOBIN: 12.1 G/DL (ref 11.7–16)
HYALINE CASTS: ABNORMAL /LPF (ref 0–2)
KETONES, URINE: NEGATIVE MG/DL
LDL CHOLESTEROL: 156 MG/DL (ref 50–99)
LDL/HDL RATIO: 2.7
LEUKOCYTE ESTERASE, URINE: NEGATIVE
LYMPHOCYTES # BLD: 29 % (ref 20–45)
LYMPHOCYTES ABSOLUTE: 2 K/UL (ref 1–4.8)
MCH RBC QN AUTO: 28 PG (ref 26–34)
MCHC RBC AUTO-ENTMCNC: 30 G/DL (ref 31–36)
MCV RBC AUTO: 95 FL (ref 80–99)
MONOCYTES ABSOLUTE: 0.4 K/UL (ref 0.1–1)
MONOCYTES: 6 % (ref 3–12)
NEUTROPHILS ABSOLUTE: 4.4 K/UL (ref 1.8–7.7)
NEUTROPHILS: 63 % (ref 40–75)
NITRITE, URINE: NEGATIVE
NON-HDL CHOLESTEROL: 181 MG/DL
PDW BLD-RTO: 16.4 % (ref 10–15.5)
PH, URINE: 5.5 PH (ref 5–8)
PLATELET # BLD: 116 K/UL (ref 140–440)
PMV BLD AUTO: 12.1 FL (ref 9–13)
POTASSIUM SERPL-SCNC: 4.3 MMOL/L (ref 3.5–5.5)
PROTEIN UA: NEGATIVE MG/DL
RBC # BLD: 4.27 M/UL (ref 3.8–5.2)
RBC URINE: ABNORMAL /HPF
SODIUM BLD-SCNC: 142 MMOL/L (ref 133–145)
SPECIFIC GRAVITY UA: 1.03 (ref 1–1.03)
T4 FREE: 1.1 NG/DL (ref 0.9–1.8)
TOTAL PROTEIN: 6.8 G/DL (ref 6.2–8.1)
TRIGL SERPL-MCNC: 124 MG/DL (ref 40–149)
TSH SERPL DL<=0.05 MIU/L-ACNC: 0.64 MCU/ML (ref 0.27–4.2)
UROBILINOGEN, URINE: 0.2 MG/DL
VLDLC SERPL CALC-MCNC: 25 MG/DL (ref 8–30)
WBC # BLD: 7 K/UL (ref 4–11)
WBC UA: ABNORMAL /HPF (ref 0–5)

## 2024-10-17 ENCOUNTER — OFFICE VISIT (OUTPATIENT)
Facility: CLINIC | Age: 64
End: 2024-10-17

## 2024-10-17 VITALS
WEIGHT: 216.8 LBS | HEART RATE: 56 BPM | OXYGEN SATURATION: 95 % | DIASTOLIC BLOOD PRESSURE: 79 MMHG | SYSTOLIC BLOOD PRESSURE: 114 MMHG | RESPIRATION RATE: 20 BRPM | HEIGHT: 67 IN | BODY MASS INDEX: 34.03 KG/M2 | TEMPERATURE: 97.3 F

## 2024-10-17 DIAGNOSIS — Z28.21 23-VALENT PNEUMOCOCCAL POLYSACCHARIDE VACCINE DECLINED: ICD-10-CM

## 2024-10-17 DIAGNOSIS — Z78.0 POSTMENOPAUSAL: ICD-10-CM

## 2024-10-17 DIAGNOSIS — Z28.21 INFLUENZA VACCINATION DECLINED: ICD-10-CM

## 2024-10-17 DIAGNOSIS — E78.00 ELEVATED LOW DENSITY LIPOPROTEIN (LDL) CHOLESTEROL LEVEL: ICD-10-CM

## 2024-10-17 DIAGNOSIS — I10 ESSENTIAL HYPERTENSION WITH GOAL BLOOD PRESSURE LESS THAN 140/90: ICD-10-CM

## 2024-10-17 DIAGNOSIS — I42.8 CARDIOMYOPATHY, NONISCHEMIC (HCC): ICD-10-CM

## 2024-10-17 DIAGNOSIS — R73.03 PREDIABETES: Primary | ICD-10-CM

## 2024-10-17 DIAGNOSIS — Z95.810 AICD (AUTOMATIC CARDIOVERTER/DEFIBRILLATOR) PRESENT: ICD-10-CM

## 2024-10-17 DIAGNOSIS — E78.2 MIXED HYPERLIPIDEMIA: ICD-10-CM

## 2024-10-17 RX ORDER — MULTIVIT-MIN/IRON/FOLIC ACID/K 18-600-40
4000 CAPSULE ORAL DAILY
Qty: 180 CAPSULE | Refills: 3 | Status: SHIPPED | OUTPATIENT
Start: 2024-10-17

## 2024-10-17 RX ORDER — PRAVASTATIN SODIUM 40 MG
40 TABLET ORAL DAILY
Qty: 30 TABLET | Refills: 5 | Status: SHIPPED | OUTPATIENT
Start: 2024-10-17 | End: 2024-10-17

## 2024-10-17 RX ORDER — PRAVASTATIN SODIUM 40 MG
40 TABLET ORAL NIGHTLY
Qty: 30 TABLET | Refills: 5 | Status: SHIPPED | OUTPATIENT
Start: 2024-10-17

## 2024-10-17 SDOH — ECONOMIC STABILITY: FOOD INSECURITY: WITHIN THE PAST 12 MONTHS, THE FOOD YOU BOUGHT JUST DIDN'T LAST AND YOU DIDN'T HAVE MONEY TO GET MORE.: NEVER TRUE

## 2024-10-17 SDOH — ECONOMIC STABILITY: INCOME INSECURITY: HOW HARD IS IT FOR YOU TO PAY FOR THE VERY BASICS LIKE FOOD, HOUSING, MEDICAL CARE, AND HEATING?: NOT VERY HARD

## 2024-10-17 SDOH — ECONOMIC STABILITY: FOOD INSECURITY: WITHIN THE PAST 12 MONTHS, YOU WORRIED THAT YOUR FOOD WOULD RUN OUT BEFORE YOU GOT MONEY TO BUY MORE.: NEVER TRUE

## 2024-10-17 ASSESSMENT — PATIENT HEALTH QUESTIONNAIRE - PHQ9
SUM OF ALL RESPONSES TO PHQ9 QUESTIONS 1 & 2: 0
SUM OF ALL RESPONSES TO PHQ QUESTIONS 1-9: 0
1. LITTLE INTEREST OR PLEASURE IN DOING THINGS: NOT AT ALL
2. FEELING DOWN, DEPRESSED OR HOPELESS: NOT AT ALL

## 2024-10-17 NOTE — PATIENT INSTRUCTIONS
[facility]  Address: [address] [place] [address] [address], [place] [address]  Phone: [phone]     Pt referred to : [facility]  [address] [facility], [address]  [place]  [address] (p) [phone]    [facility]   [address] [place] [address] [address], [place] [address]  Phone: [phone]

## 2024-10-17 NOTE — PROGRESS NOTES
Yasemin Limon is a 64 y.o. year old female who presents today for   Chief Complaint   Patient presents with    3 WK F/U        \"Have you been to the ER, urgent care clinic since your last visit?  Hospitalized since your last visit?\"   NO     “Have you seen or consulted any other health care providers outside our system since your last visit?”   NO     Have you had a mammogram?”   NO    No breast cancer screening on file      “Have you had a pap smear?”    NO    No cervical cancer screening on file       “Have you had a colorectal cancer screening such as a colonoscopy/FIT/Cologuard?    YES - Type: Colonoscopy - Where: [facility] Nurse/CMA to request most recent records if not in the chart     No colonoscopy on file  No cologuard on file  No FIT/FOBT on file   No flexible sigmoidoscopy on file           Stacy Roberson  [facility]  Phone: [phone]  Fax: [phone]

## 2024-10-17 NOTE — PROGRESS NOTES
Yasemin Limon is a 64 y.o. female  established patient, here for evaluation of the following chief complaint(s):  Chief Complaint   Patient presents with    3 WK F/U      Assessment and Plan  1. Prediabetes  Comments:  stable  2. Mixed hyperlipidemia  Comments:  start Pravastatin  3. Essential hypertension with goal blood pressure less than 140/90  Comments:  controlled, no changes  4. Cardiomyopathy, nonischemic (HCC)  Comments:  sees cardiology  5. AICD (automatic cardioverter/defibrillator) present  6. Influenza vaccination declined  7. Elevated low density lipoprotein (LDL) cholesterol level  -     pravastatin (PRAVACHOL) 40 MG tablet; Take 1 tablet by mouth at bedtime, Disp-30 tablet, R-5Normal  8. Postmenopausal  -     vitamin D 50 MCG (2000 UT) CAPS capsule; Take 2 capsules by mouth daily, Disp-180 capsule, R-3Normal  9. 23-valent pneumococcal polysaccharide vaccine declined       Return in about 1 month (around 11/17/2024) for medication evaluation Pravastatin, GI, 15.   HPI:   In office visit for annual exam. Pt is well. Pt is retired.     Discussed colon cancer screening   Needs to schedule mammogram. Pt needs a formal eye exam. She has corrective.    ROS:    General: negative for - chills, fever, weight changes or malaise  HEENT: no sore throat, nasal congestion, vision problems or ear problems  Respiratory: no cough, shortness of breath, or wheezing  Cardiovascular: no chest pain, palpitations, or dyspnea on exertion  Gastrointestinal: no abdominal pain, N/V, change in bowel habits  Musculoskeletal: no back pain or joint pain  Neurological: no headache or dizziness  Endo:  No polyuria or polydipsia  : no urinary  Skin: none   Psychological: negative for - anxiety, depression, sleeps issues    Prior to Admission medications    Medication Sig Start Date End Date Taking? Authorizing Provider   pravastatin (PRAVACHOL) 40 MG tablet Take 1 tablet by mouth at bedtime 10/17/24  Yes Camila Chow, SLOANE - CNP

## 2024-10-24 ENCOUNTER — COMMUNITY OUTREACH (OUTPATIENT)
Facility: CLINIC | Age: 64
End: 2024-10-24

## 2024-10-24 NOTE — PROGRESS NOTES
Patient's HM shows they are overdue for Mammogram and Colorectal Screening.   Care Everywhere and  files searched.   updated with 2015 CT Colonography.

## 2024-11-22 ENCOUNTER — OFFICE VISIT (OUTPATIENT)
Age: 64
End: 2024-11-22

## 2024-11-22 VITALS
HEART RATE: 57 BPM | HEIGHT: 67 IN | OXYGEN SATURATION: 96 % | WEIGHT: 216 LBS | BODY MASS INDEX: 33.9 KG/M2 | SYSTOLIC BLOOD PRESSURE: 147 MMHG | DIASTOLIC BLOOD PRESSURE: 88 MMHG

## 2024-11-22 DIAGNOSIS — E78.2 MIXED HYPERLIPIDEMIA: ICD-10-CM

## 2024-11-22 DIAGNOSIS — I10 BENIGN HYPERTENSION: ICD-10-CM

## 2024-11-22 DIAGNOSIS — Z95.810 AICD (AUTOMATIC CARDIOVERTER/DEFIBRILLATOR) PRESENT: ICD-10-CM

## 2024-11-22 DIAGNOSIS — I42.8 CARDIOMYOPATHY, NONISCHEMIC (HCC): Primary | ICD-10-CM

## 2024-11-22 NOTE — PROGRESS NOTES
Identified pt with two pt identifiers(name and ). Reviewed record in preparation for visit and have obtained necessary documentation.    Yasemin Limon presents today for   Chief Complaint   Patient presents with    Follow-up      6 month f/u       Pt deniedDIZZINESS, SOB, CHEST PAIN/ PRESSURE, FATIGUE/WEAKNESS, HEADACHES, SWELLING.             Yasemin Limon preferred language for health care discussion is english/other.    Personal Protective Equipment:   Personal Protective Equipment was used including: mask-surgical and hands-gloves. Patient was placed on no precaution(s). Patient was not masked.    Precautions:   Patient currently on None  Patient currently roomed with door closed.    Is someone accompanying this pt? no    Is the patient using any DME equipment during OV? no    Depression Screening:      10/17/2024     9:10 AM 2024     8:47 AM 2023     1:16 PM 2023    11:03 AM 2022     9:10 AM 2021     8:36 AM   PHQ-9 Questionaire   Little interest or pleasure in doing things 0 0 0 0 0 0   Feeling down, depressed, or hopeless 0 0 0 0 0 0   PHQ-9 Total Score 0 0 0 0 0 0        Learning Assessment:  No question data found.    Abuse Screening:       No data to display                   Fall Risk       No data to display                  Pt currently taking Anticoagulant /Antiplatelet therapy? no    Coordination of Care:  1. Have you been to the ER, urgent care clinic since your last visit? Hospitalized since your last visit? no    2. Have you seen or consulted any other health care providers outside of the [facility] since your last visit? Include any pap smears or colon screening. no      Please see Red banners under Allergies and Med Rec to remove outside inquires. All correct information has been verified with patient and added to chart.     Medication's patient's would liked removed has been marked not taking to be removed per Verbal order and read back per Connor

## 2024-12-09 NOTE — TELEPHONE ENCOUNTER
Called patient but she did not answer. I left a voice message asking her to return call to the office about new medicaiton from Dr. Rose. Will wait for return call before medication sent to the pharmacy.       Verbal order from Dr. Bland.  D/C Pravachol  START Lipitor 40 mg  Take 1 tablet every night.

## 2024-12-12 RX ORDER — ATORVASTATIN CALCIUM 40 MG/1
40 TABLET, FILM COATED ORAL DAILY
COMMUNITY
End: 2024-12-12 | Stop reason: SDUPTHER

## 2024-12-12 NOTE — TELEPHONE ENCOUNTER
PCP: Camila Chow APRN - CNP    Last appt:  11/22/2024   Future Appointments   Date Time Provider Department Center   12/27/2024  9:45 AM CSI, PACER [facility]   2/26/2025  1:30 PM CSI, PACER HV [facility]   5/28/2025  9:40 AM Connor Bland MD [facility]       Requested Prescriptions     Pending Prescriptions Disp Refills    atorvastatin (LIPITOR) 40 MG tablet 30 tablet 5     Sig: Take 1 tablet by mouth at bedtime Indications: High Amount of Fats in the Blood       Request for a 30 or 90 day supply? Provider Discretion    Pharmacy: correct in system    Other Comments:

## 2024-12-12 NOTE — TELEPHONE ENCOUNTER
Called patient and she was identified by full name and .  I reviewed her the message from Dr. Bland about changing cholesterol medications. I asked her to check with her pharmacy tomorrow.   Patient agreed, and verbalized understanding.

## 2024-12-13 RX ORDER — ATORVASTATIN CALCIUM 40 MG/1
40 TABLET, FILM COATED ORAL NIGHTLY
Qty: 30 TABLET | Refills: 5 | Status: SHIPPED | OUTPATIENT
Start: 2024-12-13

## 2024-12-27 ENCOUNTER — NURSE ONLY (OUTPATIENT)
Age: 64
End: 2024-12-27

## 2024-12-27 DIAGNOSIS — Z95.810 AICD (AUTOMATIC CARDIOVERTER/DEFIBRILLATOR) PRESENT: Primary | ICD-10-CM

## 2024-12-27 DIAGNOSIS — I42.8 CARDIOMYOPATHY, NONISCHEMIC (HCC): ICD-10-CM

## 2024-12-30 ENCOUNTER — TELEPHONE (OUTPATIENT)
Facility: CLINIC | Age: 64
End: 2024-12-30

## 2024-12-30 NOTE — TELEPHONE ENCOUNTER
Pt called in to express her concern, as she is not experiencing any discomfort when her urinates, however, she sees light pink mixed in w/ her urine. Would like a f/u call from the nurse or provider

## 2024-12-30 NOTE — TELEPHONE ENCOUNTER
Spoke with pt she stated she isnt having any pain or other sx. When she uses the bathroom and wipe she just sees a bright blush red color nothing else.

## 2025-01-03 ENCOUNTER — OFFICE VISIT (OUTPATIENT)
Facility: CLINIC | Age: 65
End: 2025-01-03
Payer: MEDICAID

## 2025-01-03 VITALS
TEMPERATURE: 97.9 F | WEIGHT: 214 LBS | SYSTOLIC BLOOD PRESSURE: 139 MMHG | BODY MASS INDEX: 33.59 KG/M2 | RESPIRATION RATE: 16 BRPM | HEART RATE: 54 BPM | OXYGEN SATURATION: 96 % | DIASTOLIC BLOOD PRESSURE: 84 MMHG | HEIGHT: 67 IN

## 2025-01-03 DIAGNOSIS — I10 ESSENTIAL HYPERTENSION WITH GOAL BLOOD PRESSURE LESS THAN 140/90: Primary | ICD-10-CM

## 2025-01-03 DIAGNOSIS — R31.9 HEMATURIA, UNSPECIFIED TYPE: ICD-10-CM

## 2025-01-03 LAB
BILIRUBIN, URINE, POC: NEGATIVE
BLOOD URINE, POC: NORMAL
GLUCOSE URINE, POC: NEGATIVE
KETONES, URINE, POC: NORMAL
LEUKOCYTE ESTERASE, URINE, POC: NORMAL
NITRITE, URINE, POC: NEGATIVE
PH, URINE, POC: 5.5 (ref 4.6–8)
PROTEIN,URINE, POC: NORMAL
SPECIFIC GRAVITY, URINE, POC: 1.02 (ref 1–1.03)
URINALYSIS CLARITY, POC: NORMAL
URINALYSIS COLOR, POC: YELLOW
UROBILINOGEN, POC: NORMAL MG/DL

## 2025-01-03 PROCEDURE — 3079F DIAST BP 80-89 MM HG: CPT | Performed by: NURSE PRACTITIONER

## 2025-01-03 PROCEDURE — 3075F SYST BP GE 130 - 139MM HG: CPT | Performed by: NURSE PRACTITIONER

## 2025-01-03 PROCEDURE — 99214 OFFICE O/P EST MOD 30 MIN: CPT | Performed by: NURSE PRACTITIONER

## 2025-01-03 PROCEDURE — 81001 URINALYSIS AUTO W/SCOPE: CPT | Performed by: NURSE PRACTITIONER

## 2025-01-03 SDOH — ECONOMIC STABILITY: INCOME INSECURITY: HOW HARD IS IT FOR YOU TO PAY FOR THE VERY BASICS LIKE FOOD, HOUSING, MEDICAL CARE, AND HEATING?: NOT VERY HARD

## 2025-01-03 SDOH — ECONOMIC STABILITY: FOOD INSECURITY: WITHIN THE PAST 12 MONTHS, THE FOOD YOU BOUGHT JUST DIDN'T LAST AND YOU DIDN'T HAVE MONEY TO GET MORE.: NEVER TRUE

## 2025-01-03 SDOH — ECONOMIC STABILITY: FOOD INSECURITY: WITHIN THE PAST 12 MONTHS, YOU WORRIED THAT YOUR FOOD WOULD RUN OUT BEFORE YOU GOT MONEY TO BUY MORE.: NEVER TRUE

## 2025-01-03 ASSESSMENT — PATIENT HEALTH QUESTIONNAIRE - PHQ9
SUM OF ALL RESPONSES TO PHQ QUESTIONS 1-9: 0
SUM OF ALL RESPONSES TO PHQ QUESTIONS 1-9: 0
SUM OF ALL RESPONSES TO PHQ9 QUESTIONS 1 & 2: 0
2. FEELING DOWN, DEPRESSED OR HOPELESS: NOT AT ALL
SUM OF ALL RESPONSES TO PHQ QUESTIONS 1-9: 0
1. LITTLE INTEREST OR PLEASURE IN DOING THINGS: NOT AT ALL
SUM OF ALL RESPONSES TO PHQ QUESTIONS 1-9: 0

## 2025-01-03 NOTE — PROGRESS NOTES
Yasemin Limon is a 64 y.o. female  established patient, here for evaluation of the following chief complaint(s):  Chief Complaint   Patient presents with    Hematuria        Assessment and Plan  1. Essential hypertension with goal blood pressure less than 140/90  2. Hematuria, unspecified type  -     AMB POC URINALYSIS DIP STICK AUTO W/ MICRO  -     [facility]  -     Culture, Urine; Future  -      RENAL COMPLETE; Future       Return in about 3 weeks (around 1/24/2025) for blood in urine, 15.   HPI:   In office visit. Pt is well.     Hematuria   Patient reports sees less color on toilet tissue since last Saturday, 12.28/2024. UA has shows blood in urine. Pt feels well. LMP when she was 54 years old. Denies she has vaginal bleeding or seeing blood in her underwear.    Hypertension  Controlled.  Continue Coreg 25 mg twice daily, amlodipine 10 mg daily, lisinopril 20 mg daily    Patient has pending GI referral for colonoscopy    ROS:    General: negative for - chills, fever, weight changes or malaise  HEENT: no sore throat, nasal congestion, vision problems or ear problems  Respiratory: no cough, shortness of breath, or wheezing  Cardiovascular: no chest pain, palpitations, or dyspnea on exertion  Gastrointestinal: no abdominal pain, N/V, change in bowel habits  Musculoskeletal: no back pain or joint pain  Neurological: no headache or dizziness  Endo:  No polyuria or polydipsia  : Positive for hematuria  Skin: none   Psychological: negative for - anxiety, depression, sleeps issues    Prior to Admission medications    Medication Sig Start Date End Date Taking? Authorizing Provider   atorvastatin (LIPITOR) 40 MG tablet Take 1 tablet by mouth at bedtime Indications: High Amount of Fats in the Blood 12/13/24  Yes Connor Bland MD   vitamin D 50 MCG (2000 UT) CAPS capsule Take 2 capsules by mouth daily 10/17/24  Yes Camila Chow APRN - CNP   lisinopril (PRINIVIL;ZESTRIL) 20 MG tablet

## 2025-01-03 NOTE — PROGRESS NOTES
\"Have you been to the ER, urgent care clinic since your last visit?  Hospitalized since your last visit?\"    NO    “Have you seen or consulted any other health care providers outside our system since your last visit?”    NO    Have you had a mammogram?”   NO    No breast cancer screening on file      “Have you had a pap smear?”    NO    No cervical cancer screening on file       “Have you had a colorectal cancer screening such as a colonoscopy/FIT/Cologuard?    NO    No colonoscopy on file  No cologuard on file  No FIT/FOBT on file   Date of last flexible sigmoidoscopy: 2/9/2015

## 2025-01-03 NOTE — PATIENT INSTRUCTIONS
[facility]  [address], [address]  [place]  [address]  (P) [phone]    [facility] imaging [facility] [phone]  Please let me know when you complete your imaging

## 2025-01-06 LAB — RESULT: ABNORMAL

## 2025-01-20 DIAGNOSIS — R31.9 HEMATURIA, UNSPECIFIED TYPE: ICD-10-CM

## 2025-02-26 ENCOUNTER — NURSE ONLY (OUTPATIENT)
Age: 65
End: 2025-02-26
Payer: MEDICAID

## 2025-02-26 DIAGNOSIS — Z95.810 AICD (AUTOMATIC CARDIOVERTER/DEFIBRILLATOR) PRESENT: Primary | ICD-10-CM

## 2025-02-26 DIAGNOSIS — I42.8 CARDIOMYOPATHY, NONISCHEMIC (HCC): ICD-10-CM

## 2025-02-26 PROCEDURE — 93296 REM INTERROG EVL PM/IDS: CPT | Performed by: INTERNAL MEDICINE

## 2025-02-26 PROCEDURE — 93295 DEV INTERROG REMOTE 1/2/MLT: CPT | Performed by: INTERNAL MEDICINE

## 2025-03-10 ENCOUNTER — RESULTS FOLLOW-UP (OUTPATIENT)
Age: 65
End: 2025-03-10

## 2025-05-28 NOTE — PATIENT INSTRUCTIONS
"Thyroid Biopsy    DISCHARGE INSTRUCTIONS  You have had a biopsy of your thyroid and a cheek mass.  You may resume your normal diet.  You may resume your normal medications.     Thyroid biopsy results can be confusing. Please note that a Brownwood 1 (\"nondiagnostic or insufficient specimen) result from pathology/cytology does not indicate an inadequate procedure - but rather a low risk for malignancy. Based on our recent institutional review and literature review, we consider the following risk assessment and management recommendation with each Brownwood category:     - Brownwood 1 (\"Nondiagnostic or insufficient specimen\") - low risk of malignancy, < 2.5%. Repeat biopsy is not required and should not be performed for 3 months.  - Brownwood 2 (\"Benign\") - low risk of malignancy, < 4%  - Brownwood 3 (\"Atypia or Indeterminate\") - malignancy rate poorly defined, 5-90%; requires further risk assessment with Affirma genetic analysis  - Brownwood 4 or 5 - high risk of malignancy; please follow-up with your referring physician for treatment      It is normal to experience some pain at the site over the next 2 days. You may ice the area and/or take Tylenol for that pain    Notify your primary physician and/or Interventional Radiologist IMMEDIATELY if any of the following occur:  · Fever of 101° F (38 ° C) or chills  · Swelling and or redness in the area of the puncture site  · New onset of neck pain or swelling  · Lightheadedness or dizziness upon standing    Physician Contact Information  If you have a problem that you believe requires immediate attention, you should go to the Emergency Room, either at [facility] or the closest hospital. If you believe that your problem can safely wait until you speak to a physician, you should contact your doctor or Interventional Radiologist.   It is usually easier to contact your own physician by calling the office, or after hours through the answering service. If you do not know the " Verbal order and read back per Leona Lowe MD number, the hospital  can connect you by calling [phone].   If you have concerns that need to be answered by the Interventional Radiologist, you can reach him/ her as follows:   During regular weekday hours (8AM to 5PM), call [phone] and ask the technologist to connect you with the Interventional Radiologist.   During off hours for emergencies call [phone] and ask the hospital  to contact the Interventional Radiologist on-call.        [facility] strongly recommends that you visit a Primary Care Provider (PCP) regularly. Your PCP can help you implement the recommendations we gave you today, coordinate care among your specialists, as well as make sure you are up to date with wellness exams,immunizations and preventive screenings. Your PCP can also help when you are feeling sick, potentially avoiding the need for urgent care or emergency department visits. For these reasons, it is important that you follow up with your PCP at least annually or more often based upon your medical conditions. If you do not have a PCP, please [id number] ([phone]) or go to [email or web address] help with finding one.

## 2025-05-30 ENCOUNTER — OFFICE VISIT (OUTPATIENT)
Facility: CLINIC | Age: 65
End: 2025-05-30
Payer: MEDICAID

## 2025-05-30 VITALS
SYSTOLIC BLOOD PRESSURE: 151 MMHG | HEART RATE: 61 BPM | HEIGHT: 67 IN | DIASTOLIC BLOOD PRESSURE: 79 MMHG | WEIGHT: 204 LBS | OXYGEN SATURATION: 95 % | RESPIRATION RATE: 16 BRPM | TEMPERATURE: 97.3 F | BODY MASS INDEX: 32.02 KG/M2

## 2025-05-30 DIAGNOSIS — G89.29 CHRONIC RIGHT-SIDED LOW BACK PAIN WITH RIGHT-SIDED SCIATICA: Primary | ICD-10-CM

## 2025-05-30 DIAGNOSIS — M54.41 CHRONIC RIGHT-SIDED LOW BACK PAIN WITH RIGHT-SIDED SCIATICA: Primary | ICD-10-CM

## 2025-05-30 DIAGNOSIS — N20.0 KIDNEY STONE: ICD-10-CM

## 2025-05-30 DIAGNOSIS — I10 ESSENTIAL HYPERTENSION WITH GOAL BLOOD PRESSURE LESS THAN 140/90: ICD-10-CM

## 2025-05-30 PROCEDURE — 99214 OFFICE O/P EST MOD 30 MIN: CPT | Performed by: NURSE PRACTITIONER

## 2025-05-30 PROCEDURE — 3077F SYST BP >= 140 MM HG: CPT | Performed by: NURSE PRACTITIONER

## 2025-05-30 PROCEDURE — 3078F DIAST BP <80 MM HG: CPT | Performed by: NURSE PRACTITIONER

## 2025-05-30 RX ORDER — CYCLOBENZAPRINE HCL 10 MG
10 TABLET ORAL NIGHTLY PRN
Qty: 30 TABLET | Refills: 0 | Status: SHIPPED | OUTPATIENT
Start: 2025-05-30 | End: 2025-06-29

## 2025-05-30 SDOH — ECONOMIC STABILITY: FOOD INSECURITY: WITHIN THE PAST 12 MONTHS, THE FOOD YOU BOUGHT JUST DIDN'T LAST AND YOU DIDN'T HAVE MONEY TO GET MORE.: NEVER TRUE

## 2025-05-30 SDOH — ECONOMIC STABILITY: FOOD INSECURITY: WITHIN THE PAST 12 MONTHS, YOU WORRIED THAT YOUR FOOD WOULD RUN OUT BEFORE YOU GOT MONEY TO BUY MORE.: NEVER TRUE

## 2025-05-30 ASSESSMENT — PATIENT HEALTH QUESTIONNAIRE - PHQ9
2. FEELING DOWN, DEPRESSED OR HOPELESS: NOT AT ALL
SUM OF ALL RESPONSES TO PHQ QUESTIONS 1-9: 0
SUM OF ALL RESPONSES TO PHQ QUESTIONS 1-9: 0
1. LITTLE INTEREST OR PLEASURE IN DOING THINGS: NOT AT ALL
SUM OF ALL RESPONSES TO PHQ QUESTIONS 1-9: 0
SUM OF ALL RESPONSES TO PHQ QUESTIONS 1-9: 0

## 2025-05-30 NOTE — PROGRESS NOTES
(LIPITOR) 40 MG tablet Take 1 tablet by mouth at bedtime Indications: High Amount of Fats in the Blood 12/13/24  Yes Connor Bland MD   vitamin D 50 MCG (2000 UT) CAPS capsule Take 2 capsules by mouth daily 10/17/24  Yes Camila Chow APRN - CNP   lisinopril (PRINIVIL;ZESTRIL) 20 MG tablet Take 1 tablet by mouth daily 9/18/24  Yes Camila Chow APRN - CNP   carvedilol (COREG) 25 MG tablet Take 1 tablet by mouth in the morning and at bedtime 9/18/24  Yes Camila Chow APRN - CNP   amLODIPine (NORVASC) 10 MG tablet Take 1 tablet by mouth daily 9/18/24  Yes Camila Chow APRN - CNP   Biotin 5000 MCG CAPS Take 1 mcg by mouth daily   Yes ProviderJet MD   Ascorbic Acid (VITAMIN C) 1000 MG tablet Take 1 tablet by mouth daily   Yes Provider, MD Jet        No results found for this visit on 05/30/25.     Physical Exam  Patient appears well, she is pleasant, alert, oriented x 3, in no distress. obese  ENT normal.  Neck supple. No adenopathy or thyromegaly. YOLANDA.   Lungs are clear, good air entry, no wheezes  Cardiovascular, S1 and S2 normal, no murmurs, regular rate and rhythm.   Abdomen is soft without tenderness, guarding  /Anorectal, deferred.  Muscleskeletal, no swelling  Extremities show no edema  Neurological is normal without focal findings.   Skin: no concerning lesions.    Psych: normal affect.  Mood good.  Oriented x 3.      Vitals:    05/30/25 0949   BP: (!) 151/79   Pulse: 61   Resp: 16   Temp: 97.3 °F (36.3 °C)   TempSrc: Temporal   SpO2: 95%   Weight: 92.5 kg (204 lb)   Height: 1.702 m (5' 7\")      Pain Score:   8     On this date 05/30/25  have spent 30 minutes reviewing previous notes, test results and face to face with the patient discussing the diagnosis and importance of compliance with the treatment plan as well as documenting on the day of the visit.      DAVID Tao

## 2025-06-09 ENCOUNTER — RESULTS FOLLOW-UP (OUTPATIENT)
Age: 65
End: 2025-06-09

## 2025-06-09 ENCOUNTER — HOSPITAL ENCOUNTER (OUTPATIENT)
Facility: HOSPITAL | Age: 65
Setting detail: RECURRING SERIES
Discharge: HOME | End: 2025-06-12
Payer: MEDICAID

## 2025-06-09 PROCEDURE — 97162 PT EVAL MOD COMPLEX 30 MIN: CPT

## 2025-06-09 NOTE — PROGRESS NOTES
AM  ===================================================================  I certify that the above Therapy Services are being furnished while the patient is under my care. I agree with the treatment plan and certify that this therapy is necessary.    Physician's Signature:_________________________   DATE:_________   TIME:________                           Camila Chow APRN -*    ** Signature, Date and Time must be completed for valid certification **  Please sign and return to [facility] or you may fax the signed copy to [phone].  Thank you.    
address soft tissue restrictions, analyze and cue for proper movement patterns, and analyze and modify for postural abnormalities to address functional deficits and attain remaining goals.    Progress toward goals / Updated goals:  [x]  See POC    Short term goals: to be completed within 2 sessions  1) The patient will be independent with introductory HEP              Status at IE: HEP to be given at NV.     Long term goals: to be completed within 8 sessions  1) The patient will demonstrate 5/5 LE strength to ease stair negotiation.              Status at IE: Flexion, extension, abduction 4-/5.  2) The patient will  improve Oswetry score to 0% disability in order to ease work duties and return to walking recreationally.              Status at IE: 24%  3) The patient will report decreased pain at worst to 0/10 in order to ease stair negotiation to her bedroom.               Status at IE: pain at worst 10+/10.  4) The patient will demonstrate HS 90/90 on right to 10 deg or less in order to grab object off floor.   Status at IE: 25 deg on R.    PLAN  yes Continue plan of care  []  Upgrade activities as tolerated  []  Discharge due to :  [x]  Other: Pt to be seen 2x/ week for 10 visits.     Justification for Eval Code Complexity:  Patient History : Moderate - HTN, MI, high cholesterol  Examination see exam: High  Clinical Presentation: Moderate - evolving  Clinical Decision Making : Oswestry: 24% - missing data for 3 questions.    Gladys Dash PT    6/9/2025    10:46 AM    Future Appointments   Date Time Provider Department Center   6/9/2025  2:40 PM Gladys Dash PT [facility]   6/10/2025 10:00 AM Jenaro Thomas MD [facility] Sched   6/25/2025  3:40 PM Connor Bland MD [facility]     If an interpreting service was utilized for treatment of this patient, the contents of this document represent the material reviewed with the patient via the .    Charge Capture

## 2025-06-12 ENCOUNTER — HOSPITAL ENCOUNTER (OUTPATIENT)
Facility: HOSPITAL | Age: 65
Setting detail: RECURRING SERIES
Discharge: HOME | End: 2025-06-15
Payer: MEDICAID

## 2025-06-12 PROCEDURE — 97530 THERAPEUTIC ACTIVITIES: CPT

## 2025-06-12 PROCEDURE — 97110 THERAPEUTIC EXERCISES: CPT

## 2025-06-12 PROCEDURE — 97112 NEUROMUSCULAR REEDUCATION: CPT

## 2025-06-12 NOTE — PROGRESS NOTES
PHYSICAL / OCCUPATIONAL THERAPY - DAILY TREATMENT NOTE (updated )    Patient Name: Yasemin Limon    Date: 2025    : 1960  Ins[facility]: [facility] / Plan: [facility] CARDINAL CARE / Product Type: *No Product type* /      Patient  verified yes     Visit #   Current / Total 2 10   Time   In / Out 320 418   Total Treatment Time 58   Pain   In / Out 0 0   Subjective Functional Status/Changes: Pt reports no pain today other than when it catches her in the moment.      NEXT PROGRESS NOTE DUE: 25    TREATMENT AREA =  Chronic right-sided low back pain with right-sided sciatica    OBJECTIVE    Modalities Rationale:     decrease edema, decrease inflammation, decrease pain, and increase muscle contraction/control to improve patient's ability to progress to PLOF and address remaining functional goals.     min [] Estim Unattended, type/location:                                      []  w/ice    []  w/heat    min [] Estim Attended, type/location:                                     []  w/US     []  w/ice    []  w/heat    []  TENS insruct      min []  Mechanical Traction: type/lbs                   []  pro   []  sup   []  int   []  cont    []  before manual    []  after manual    min []  Ultrasound, settings/location:      min []  Iontophoresis w/ dexamethasone, location:                                               []  take home patch       []  in clinic   10 min  unbilled []  Ice     [x]  Heat    location/position: SL, R hip    min []  Paraffin,  details:     min []  Vasopneumatic Device, press/temp:     min []  Whirlpool / Fluido:    If using vaso (only need to measure limb vaso being performed on)      pre-treatment girth :       post-treatment girth :       measured at (landmark location) :      min []  Other:    Skin assessment post-treatment (if applicable):    []  intact    []  redness- no adverse reaction                 []redness - adverse reaction:      Vasopnuematic

## 2025-06-17 ENCOUNTER — HOSPITAL ENCOUNTER (OUTPATIENT)
Facility: HOSPITAL | Age: 65
Setting detail: RECURRING SERIES
End: 2025-06-17
Payer: MEDICAID

## 2025-06-19 ENCOUNTER — HOSPITAL ENCOUNTER (OUTPATIENT)
Facility: HOSPITAL | Age: 65
Setting detail: RECURRING SERIES
Discharge: HOME | End: 2025-06-22
Payer: MEDICAID

## 2025-06-19 PROCEDURE — 97112 NEUROMUSCULAR REEDUCATION: CPT

## 2025-06-19 PROCEDURE — 97110 THERAPEUTIC EXERCISES: CPT

## 2025-06-19 PROCEDURE — 97530 THERAPEUTIC ACTIVITIES: CPT

## 2025-06-19 NOTE — PROGRESS NOTES
PHYSICAL / OCCUPATIONAL THERAPY - DAILY TREATMENT NOTE (updated )    Patient Name: Yasemin Limon    Date: 2025    : 1960  [facility] / Plan: [facility]AN CARDINAL CARE / Product Type: *No Product type* /      Patient  verified yes     Visit #   Current / Total 3 10   Time   In / Out 200 249   Total Treatment Time 49   Pain   In / Out 9 2-3   Subjective Functional Status/Changes: Pt reports continue pain through upper butt on both sides. Reports sharp pain is worst part as is will come very fast and then go away.       NEXT PROGRESS NOTE DUE: 25    TREATMENT AREA =  Chronic right-sided low back pain with right-sided sciatica    OBJECTIVE    Modalities Rationale:     decrease edema, decrease inflammation, decrease pain, and increase muscle contraction/control to improve patient's ability to progress to PLOF and address remaining functional goals.     min [] Estim Unattended, type/location:                                      []  w/ice    []  w/heat    min [] Estim Attended, type/location:                                     []  w/US     []  w/ice    []  w/heat    []  TENS insruct      min []  Mechanical Traction: type/lbs                   []  pro   []  sup   []  int   []  cont    []  before manual    []  after manual    min []  Ultrasound, settings/location:      min []  Iontophoresis w/ dexamethasone, location:                                               []  take home patch       []  in clinic   PD min  unbilled []  Ice     [x]  Heat    location/position: SL, R hip    min []  Paraffin,  details:     min []  Vasopneumatic Device, press/temp:     min []  Whirlpool / Fluido:    If using vaso (only need to measure limb vaso being performed on)      pre-treatment girth :       post-treatment girth :       measured at (landmark location) :      min []  Other:    Skin assessment post-treatment (if applicable):    []  intact    []  redness- no adverse reaction

## 2025-06-24 ENCOUNTER — HOSPITAL ENCOUNTER (OUTPATIENT)
Facility: HOSPITAL | Age: 65
Setting detail: RECURRING SERIES
Discharge: HOME | End: 2025-06-27
Payer: MEDICAID

## 2025-06-24 PROCEDURE — 97112 NEUROMUSCULAR REEDUCATION: CPT

## 2025-06-24 PROCEDURE — 97110 THERAPEUTIC EXERCISES: CPT

## 2025-06-24 PROCEDURE — 97530 THERAPEUTIC ACTIVITIES: CPT

## 2025-06-24 NOTE — PROGRESS NOTES
PHYSICAL / OCCUPATIONAL THERAPY - DAILY TREATMENT NOTE (updated )    Patient Name: Yasemin Limon    Date: 2025    : 1960  [facility]: [facility] / Plan: [facility] CARDINAL CARE / Product Type: *No Product type* /      Patient  verified yes     Visit #   Current / Total 4 10   Time   In / Out 1220 1   Total Treatment Time 40   Pain   In / Out 1-2 0-1   Subjective Functional Status/Changes: Pt reports she is scheduled for kidney stone surgery      NEXT PROGRESS NOTE DUE: 25    TREATMENT AREA =  Chronic right-sided low back pain with right-sided sciatica    OBJECTIVE    Modalities Rationale:     decrease edema, decrease inflammation, decrease pain, and increase muscle contraction/control to improve patient's ability to progress to PLOF and address remaining functional goals.     min [] Estim Unattended, type/location:                                      []  w/ice    []  w/heat    min [] Estim Attended, type/location:                                     []  w/US     []  w/ice    []  w/heat    []  TENS insruct      min []  Mechanical Traction: type/lbs                   []  pro   []  sup   []  int   []  cont    []  before manual    []  after manual    min []  Ultrasound, settings/location:      min []  Iontophoresis w/ dexamethasone, location:                                               []  take home patch       []  in clinic   PD min  unbilled []  Ice     []  Heat    location/position: SL, R hip     min []  Paraffin,  details:     min []  Vasopneumatic Device, press/temp:     min []  Whirlpool / Fluido:    If using vaso (only need to measure limb vaso being performed on)      pre-treatment girth :       post-treatment girth :       measured at (landmark location) :      min []  Other:    Skin assessment post-treatment (if applicable):    []  intact    []  redness- no adverse reaction                 []redness - adverse reaction:      Vasopnuematic compression

## 2025-06-25 ENCOUNTER — OFFICE VISIT (OUTPATIENT)
Age: 65
End: 2025-06-25
Payer: MEDICAID

## 2025-06-25 VITALS
WEIGHT: 203 LBS | HEIGHT: 67 IN | HEART RATE: 68 BPM | DIASTOLIC BLOOD PRESSURE: 2 MMHG | OXYGEN SATURATION: 94 % | BODY MASS INDEX: 31.86 KG/M2 | SYSTOLIC BLOOD PRESSURE: 103 MMHG

## 2025-06-25 DIAGNOSIS — Z01.818 PREOPERATIVE CLEARANCE: Primary | ICD-10-CM

## 2025-06-25 PROCEDURE — 3078F DIAST BP <80 MM HG: CPT | Performed by: INTERNAL MEDICINE

## 2025-06-25 PROCEDURE — 99214 OFFICE O/P EST MOD 30 MIN: CPT | Performed by: INTERNAL MEDICINE

## 2025-06-25 PROCEDURE — 3074F SYST BP LT 130 MM HG: CPT | Performed by: INTERNAL MEDICINE

## 2025-06-25 ASSESSMENT — PATIENT HEALTH QUESTIONNAIRE - PHQ9
SUM OF ALL RESPONSES TO PHQ QUESTIONS 1-9: 0
SUM OF ALL RESPONSES TO PHQ QUESTIONS 1-9: 0
1. LITTLE INTEREST OR PLEASURE IN DOING THINGS: NOT AT ALL
SUM OF ALL RESPONSES TO PHQ QUESTIONS 1-9: 0
SUM OF ALL RESPONSES TO PHQ QUESTIONS 1-9: 0
2. FEELING DOWN, DEPRESSED OR HOPELESS: NOT AT ALL

## 2025-06-25 NOTE — PROGRESS NOTES
Identified pt with two pt identifiers(name and ). Reviewed record in preparation for visit and have obtained necessary documentation.    Yasemin Limon presents today for   Chief Complaint   Patient presents with    Follow-up       6 months f/u  needs in office device check in Dec 2025           Pt denies DIZZINESS, SOB, CHEST PAIN/ PRESSURE, FATIGUE/WEAKNESS, HEADACHES, SWELLING.             Yasemin Limon preferred language for health care discussion is english/other.    Personal Protective Equipment:   Personal Protective Equipment was used including: mask-surgical and hands-gloves. Patient was placed on no precaution(s). Patient was not masked.    Precautions:   Patient currently on None  Patient currently roomed with door closed.    Is someone accompanying this pt? no    Is the patient using any DME equipment during OV? no    Depression Screenin/25/2025     3:06 PM 2025     9:48 AM 1/3/2025     7:47 AM 10/17/2024     9:10 AM 2024     8:47 AM 2023     1:16 PM 2023    11:03 AM   PHQ-9 Questionaire   Little interest or pleasure in doing things 0 0 0 0 0 0 0   Feeling down, depressed, or hopeless 0 0 0 0 0 0 0   PHQ-9 Total Score 0 0 0 0 0 0 0        Learning Assessment:  Who is the primary learner? Patient    What is the preferred language for health care of the primary learner? ENGLISH    How does the primary learner prefer to learn new concepts? DEMONSTRATION    Answered By PATIENT    Relationship to Learner SELF        Abuse Screenin/22/2024     8:00 AM   AMB Abuse Screening   Do you ever feel afraid of your partner? N   Are you in a relationship with someone who physically or mentally threatens you? N   Is it safe for you to go home? Y        Fall Risk      2024     8:41 AM   Fall Risk   Do you feel unsteady or are you worried about falling?  no   2 or more falls in past year? no   Fall with injury in past year? no         Pt currently taking Anticoagulant

## 2025-06-26 ENCOUNTER — HOSPITAL ENCOUNTER (OUTPATIENT)
Facility: HOSPITAL | Age: 65
Setting detail: RECURRING SERIES
Discharge: HOME | End: 2025-06-29
Payer: MEDICAID

## 2025-06-26 PROCEDURE — 97110 THERAPEUTIC EXERCISES: CPT

## 2025-06-26 PROCEDURE — 97112 NEUROMUSCULAR REEDUCATION: CPT

## 2025-06-26 PROCEDURE — 97530 THERAPEUTIC ACTIVITIES: CPT

## 2025-06-26 NOTE — PROGRESS NOTES
23-37 min = 2 units; 38-52 min = 3 units; 53-67 min = 4 units; 68-82 min = 5 units   Total Total     [x]  Patient Education billed concurrently with other procedures   [x] Review HEP    [] Progressed/Changed HEP, detail:    [] Other detail:       Objective Information/Functional Measures/Assessment:  Patient continues to require cues for post pelvic tilt, presents with some difficulty with muscle activation   Educated patient the need for stable core for reducing lumbar pain   Noted decreased glute and hamstring recruitment during bridges, cues for recruiting hamstring and glute   Patient reported no  pain at end of session      Patient will continue to benefit from skilled PT / OT services to modify and progress therapeutic interventions, analyze and address functional mobility deficits, analyze and address ROM deficits, analyze and address strength deficits, analyze and address soft tissue restrictions, analyze and cue for proper movement patterns, and analyze and modify for postural abnormalities to address functional deficits and attain remaining goals.    Progress toward goals / Updated goals:  []  See Progress Note/Recertification  Short term goals: to be completed within 2 sessions  1) The patient will be independent with introductory HEP              Status at IE: HEP to be given at NV.   Current: Pt reports compliance. 6/19/25     Long term goals: to be completed within 8 sessions  1) The patient will demonstrate 5/5 LE strength to ease stair negotiation.              Status at IE: Flexion, extension, abduction 4-/5.   Current: Able to complete hip 3 way with PTB. 6/19/25  Current: addressing with hip 3 way exercise per flowsheet (6/24/25)    2) The patient will  improve Oswetry score to 0% disability in order to ease work duties and return to walking recreationally.              Status at IE: 24%    3) The patient will report decreased pain at worst to 0/10 in order to ease stair negotiation to her

## 2025-06-30 ENCOUNTER — HOSPITAL ENCOUNTER (OUTPATIENT)
Facility: HOSPITAL | Age: 65
Setting detail: RECURRING SERIES
Discharge: HOME | End: 2025-07-03
Payer: MEDICAID

## 2025-06-30 PROCEDURE — 97110 THERAPEUTIC EXERCISES: CPT

## 2025-06-30 PROCEDURE — 97530 THERAPEUTIC ACTIVITIES: CPT

## 2025-06-30 PROCEDURE — 97112 NEUROMUSCULAR REEDUCATION: CPT

## 2025-06-30 NOTE — PROGRESS NOTES
PHYSICAL / OCCUPATIONAL THERAPY - DAILY TREATMENT NOTE (updated )    Patient Name: Yasemin Limon    Date: 2025    : 1960  Ins[facility]: [facility] / Plan: [facility] PLAN CARDINAL CARE / Product Type: *No Product type* /      Patient  verified yes     Visit #   Current / Total 6 10   Time   In / Out 940 1025   Total Treatment Time 45   Pain   In / Out 1-2 0   Subjective Functional Status/Changes: Pt reports her pain at worst this past weekend was a 2/10. She has not had the sharp pain that catches her.       NEXT PROGRESS NOTE DUE: 25    TREATMENT AREA =  Chronic right-sided low back pain with right-sided sciatica    OBJECTIVE    Modalities Rationale:     decrease edema, decrease inflammation, decrease pain, and increase muscle contraction/control to improve patient's ability to progress to PLOF and address remaining functional goals.     min [] Estim Unattended, type/location:                                      []  w/ice    []  w/heat    min [] Estim Attended, type/location:                                     []  w/US     []  w/ice    []  w/heat    []  TENS insruct      min []  Mechanical Traction: type/lbs                   []  pro   []  sup   []  int   []  cont    []  before manual    []  after manual    min []  Ultrasound, settings/location:      min []  Iontophoresis w/ dexamethasone, location:                                               []  take home patch       []  in clinic   PD min  unbilled []  Ice     []  Heat    location/position: SL, R hip     min []  Paraffin,  details:     min []  Vasopneumatic Device, press/temp:     min []  Whirlpool / Fluido:    If using vaso (only need to measure limb vaso being performed on)      pre-treatment girth :       post-treatment girth :       measured at (landmark location) :      min []  Other:    Skin assessment post-treatment (if applicable):    []  intact    []  redness- no adverse reaction                 []redness -

## 2025-07-04 NOTE — PROGRESS NOTES
[facility] Follow-Up Note    Assessment/Plan:     Assessment & Plan    Nonischemic cardiomyopathy, HFrEF      2.  Cardiac arrest, status post AICD 2016.  [employer]. Visla  AF MRI VR [id number].  Recently interrogated 02/26/2025 neurology, rule out      3.. Preoperative evaluation for nephrolithiasis:  - Scheduled for surgery on 07/07/2025 at [facility]  - Continue current regimen of amlodipine and carvedilol on the day of surgery to manage blood pressure  - Not currently on an anticoagulant  - No cardiac contraindication to proposed surgical procedure.  She has had no chest pain suggestive of an ischemic cardiac etiology and no symptoms of congestive heart failure or unstable cardiac rhythm.      2. Muscle pain:  - Reports muscle pain radiating from her back down to her leg, attributed to either arthritis or sciatica by her therapist  - Undergoing therapy at [facility]  - Finds relief from 8-hour arthritis pain relief medication and Epsom salt soaks  - Continue current therapy regimen     LVH (left ventricular hypertrophy) 07/21/2016         AICD (automatic cardioverter/defibrillator) present 05/31/2016    Essential hypertension with goal blood pressure less than 140/90 05/18/2016    Prediabetes 05/18/2016    Mixed hyperlipidemia 05/18/2016    Hepatitis B carrier (HCC) 05/18/2016    Anemia 05/18/2016               Follow-up:  - Follow up in 4 to 5 months      History of Present Illness  The patient is a 64-year-old woman who presents for preoperative evaluation for a kidney stone procedure.    The patient has been in consultation with [facility], where a CT scan revealed the presence of a kidney stone. Subsequently, a cystoscopy was performed to rule out malignancy, followed by an x-ray. She is scheduled for surgery at [facility] on 07/07/2025. She reports no chest pain or shortness of breath and is not currently on any anticoagulant therapy.    She was

## (undated) DEVICE — DRESSING FOAM 4X6 DISP POSTOP MEPILEX BORD AG

## (undated) DEVICE — MEDI-TRACE CADENCE ADULT, DEFIBRILLATION ELECTRODE -RTS  (10 PR/PK) - PHYSIO-CONTROL: Brand: MEDI-TRACE CADENCE

## (undated) DEVICE — SUTURE ABSORBABLE BRAIDED 2-0 CT-1 27 IN UD VICRYL J259H

## (undated) DEVICE — SUTURE MCRYL SZ 4 0 L18IN ABSRB VLT PS 1 L24MM 3 8 CIR REV Y682H

## (undated) DEVICE — Device

## (undated) DEVICE — DRAPE STRL ANGIO W/ 2 FLD COLLCTN PCH 86X135 218X343 CM 2

## (undated) DEVICE — REM POLYHESIVE ADULT PATIENT RETURN ELECTRODE: Brand: VALLEYLAB

## (undated) DEVICE — LIMB HOLDER, WRIST/ANKLE: Brand: DEROYAL

## (undated) DEVICE — INTRO SHTH 9FR 13X20CM -- USE ITEM# 341577

## (undated) DEVICE — 3M™ IOBAN™ 2 ANTIMICROBIAL INCISE DRAPE 6640EZ: Brand: IOBAN™ 2

## (undated) DEVICE — HI-TORQUE VERSACORE FLOPPY GUIDE WIRE SYSTEM 145 CM: Brand: HI-TORQUE VERSACORE

## (undated) DEVICE — CABLE PACE ALGTR CLP SAF 12FT --

## (undated) DEVICE — KENDALL RADIOLUCENT FOAM MONITORING ELECTRODE RECTANGULAR SHAPE: Brand: KENDALL

## (undated) DEVICE — PLASMABLADE PS210-030S 3.0S LOCK: Brand: PLASMABLADE™

## (undated) DEVICE — DRAPE SURG W25XL50IN E OPN CIR BND BG

## (undated) DEVICE — SUTURE PERMA HND SZ 0 L18IN NONABSORBABLE BLK L30MM PSL REV 580H